# Patient Record
Sex: FEMALE | Race: WHITE | Employment: UNEMPLOYED | URBAN - METROPOLITAN AREA
[De-identification: names, ages, dates, MRNs, and addresses within clinical notes are randomized per-mention and may not be internally consistent; named-entity substitution may affect disease eponyms.]

---

## 2020-03-27 ENCOUNTER — TELEPHONE (OUTPATIENT)
Dept: FAMILY MEDICINE CLINIC | Age: 25
End: 2020-03-27

## 2023-12-13 ENCOUNTER — ROUTINE PRENATAL (OUTPATIENT)
Dept: OBGYN CLINIC | Age: 28
End: 2023-12-13

## 2023-12-13 VITALS
SYSTOLIC BLOOD PRESSURE: 116 MMHG | WEIGHT: 142 LBS | DIASTOLIC BLOOD PRESSURE: 80 MMHG | BODY MASS INDEX: 22.92 KG/M2 | HEART RATE: 98 BPM

## 2023-12-13 DIAGNOSIS — Z3A.14 14 WEEKS GESTATION OF PREGNANCY: ICD-10-CM

## 2023-12-13 DIAGNOSIS — Z34.00 SUPERVISION OF NORMAL FIRST PREGNANCY, ANTEPARTUM: Primary | ICD-10-CM

## 2023-12-13 PROCEDURE — 0502F SUBSEQUENT PRENATAL CARE: CPT

## 2023-12-13 RX ORDER — MAGNESIUM 30 MG
30 TABLET ORAL 2 TIMES DAILY
COMMUNITY

## 2023-12-13 NOTE — PROGRESS NOTES
SUBJECTIVE:  Remy Ballard is here for her return OB visit & Physical Exam.   She denies feeling fetal movement. She denies vaginal bleeding. She denies vaginal discharge. She denies leaking of fluid. She denies uterine cramping. She denies nausea and/or vomiting. Remy Ballard reports she is doing well, no concerns today. Has been having intermittent headaches but started taking magnesium which has been helpful. OBJECTIVE:  Blood pressure 116/80, pulse 98, weight 64.4 kg (142 lb), last menstrual period 08/17/2023, not currently breastfeeding. Pregravid BMI: 21.80   TWG: 3.175 kg (7 lb)    See OB Physical in Epic Navigator   Last pap: 1/23/2020 - will do PP    O negative    Remy Ballard has not received the influenza vaccine as appropriate - declines for now    ASSESSMENT/PLAN:  IUP at [address] [facility] will watch for fetal movement. NT screening was previously declined  NIPT was previously accepted: WNL  Carrier screening was previously accepted: negative  [x] EDC was established.  [] Labs were reviewed. [x] Single sMAFP was ordered for the 16-20 wk gestational window. [] Weight gain guidelines was not reviewed. Normal: BMI < 25: Gain 25-35 lb  Overweight: BMI 25-30: Gain 15-25 lb  Obese: BMI > 30: Gain 11-20 lb  [x] Reviewed warning signs: cramping, acute abdominal pain, dysuria, fever/chills, abnormal vaginal discharge or leaking fluid, or vaginal bleeding. [x] MFM referral in place for anatomy scan at 18-22 weeks. [x] I have reviewed RN Initial OB Visit. S = D    Remy Ballard was seen today for routine prenatal visit. Diagnoses and all orders for this visit:    Supervision of normal first pregnancy, antepartum  Anatomy scan 1/25/24  NIPT WNL    14 weeks gestation of pregnancy      Return in about 4 weeks (around 1/10/2024) for OB visit with midwives.     The patient, Betty Bonilla, was seen with a total time spent of 30 minutes for the visit on this date of service by the [facility]  The time component

## 2024-01-08 PROBLEM — L50.9 URTICARIA: Chronic | Status: RESOLVED | Noted: 2017-12-14 | Resolved: 2024-01-08

## 2024-01-08 NOTE — PROGRESS NOTES
SUBJECTIVE:  Neha is here for her return OB visit.  She is doing well overall, but reporting increased vaginal discharge without odor, irritation, itchy, etc, unsure if normal  Occasional left sided pain as well, still with lingering fatigue  She reports unsure fetal movement.  She denies vaginal bleeding.  She denies leaking of fluid.  She denies uterine contraction activity.  She denies nausea and/or vomiting.  She denies retaining fluid in her extremities.    OBJECTIVE:  Blood pressure 110/60, pulse (!) 104, weight 65.8 kg (145 lb), last menstrual period 08/17/2023, not currently breastfeeding.        ASSESSMENT/PLAN:  1. 18 weeks gestation of pregnancy  S=D  2. Supervision of normal first pregnancy, antepartum  The problem list was reviewed and updated as needed with any new issues today    Neha will begin to monitor fetal movement daily    [x] sAFP  was  discussed and she declined today  [x] Anticipatory Guidance was reviewed today  [x] Has 2nd trimester US scheduled  - Vaginitis DNA Probe; Future    3. Vaginal discharge  - Discussed probable normal discharge in pregnancy, given discomfort will await results  - Vaginitis DNA Probe; Future      Neha was counseled regarding all of the above    The patient, Neha Kolb,  was seen with a total time spent of 20 minutes for the visit on this date of service by the [facility]  The time component involved both face-to-face (counseling and education) and non face-to-face time (care coordination), spent in determining the total time component.     On this date January 10, 2024,  I have spent greater than 50% of this visit:  [x] Reviewing previous notes/pre-charting  [] Reviewing test results  [] Performing necessary physical exam/evaluation  [x] Ordering medications, tests, procedures  [] Placing referrals or communicating with other HCP  [x] Documenting and updating Problem List as necessary  [x] Importance of compliance with treatment plan discussed  [x]

## 2024-01-10 ENCOUNTER — HOSPITAL ENCOUNTER (OUTPATIENT)
Age: 29
Setting detail: SPECIMEN
Discharge: HOME | End: 2024-01-10

## 2024-01-10 ENCOUNTER — ROUTINE PRENATAL (OUTPATIENT)
Dept: OBGYN CLINIC | Age: 29
End: 2024-01-10

## 2024-01-10 VITALS
HEART RATE: 104 BPM | WEIGHT: 145 LBS | BODY MASS INDEX: 23.4 KG/M2 | DIASTOLIC BLOOD PRESSURE: 60 MMHG | SYSTOLIC BLOOD PRESSURE: 110 MMHG

## 2024-01-10 DIAGNOSIS — N89.8 VAGINAL DISCHARGE: ICD-10-CM

## 2024-01-10 DIAGNOSIS — Z34.00 SUPERVISION OF NORMAL FIRST PREGNANCY, ANTEPARTUM: Primary | ICD-10-CM

## 2024-01-10 DIAGNOSIS — Z3A.18 18 WEEKS GESTATION OF PREGNANCY: ICD-10-CM

## 2024-01-10 PROCEDURE — 0502F SUBSEQUENT PRENATAL CARE: CPT | Performed by: ADVANCED PRACTICE MIDWIFE

## 2024-01-10 NOTE — PROGRESS NOTES
Pt is here today at her 18w4 prenatal visit  Pt states fetal movement is unsure  Pt has  concerns about vaginal discharge  Pt declines flu shot

## 2024-01-11 DIAGNOSIS — Z34.00 SUPERVISION OF NORMAL FIRST PREGNANCY, ANTEPARTUM: ICD-10-CM

## 2024-01-11 DIAGNOSIS — Z3A.18 18 WEEKS GESTATION OF PREGNANCY: ICD-10-CM

## 2024-01-11 DIAGNOSIS — N89.8 VAGINAL DISCHARGE: ICD-10-CM

## 2024-01-11 LAB
CANDIDA SPECIES: NEGATIVE
GARDNERELLA VAGINALIS: NEGATIVE
SOURCE: NORMAL
TRICHOMONAS: NEGATIVE

## 2024-01-25 ENCOUNTER — ROUTINE PRENATAL (OUTPATIENT)
Dept: PERINATAL CARE | Age: 29
End: 2024-01-25
Payer: COMMERCIAL

## 2024-01-25 VITALS
HEIGHT: 66 IN | SYSTOLIC BLOOD PRESSURE: 96 MMHG | BODY MASS INDEX: 24.27 KG/M2 | WEIGHT: 151 LBS | HEART RATE: 80 BPM | DIASTOLIC BLOOD PRESSURE: 58 MMHG | TEMPERATURE: 98 F | RESPIRATION RATE: 16 BRPM

## 2024-01-25 DIAGNOSIS — O44.40 LOW IMPLANTATION OF PLACENTA WITHOUT HEMORRHAGE: ICD-10-CM

## 2024-01-25 DIAGNOSIS — Z3A.20 20 WEEKS GESTATION OF PREGNANCY: ICD-10-CM

## 2024-01-25 DIAGNOSIS — O35.10X0 FAMILY HISTORIC RISK OF CHROMOSOMAL ABNORMALITY IN FETUS, ANTEPARTUM, SINGLE OR UNSPECIFIED FETUS: Primary | ICD-10-CM

## 2024-01-25 DIAGNOSIS — Z36.86 ENCOUNTER FOR SCREENING FOR RISK OF PRE-TERM LABOR: ICD-10-CM

## 2024-01-25 PROCEDURE — 99999 PR OFFICE/OUTPT VISIT,PROCEDURE ONLY: CPT | Performed by: OBSTETRICS & GYNECOLOGY

## 2024-01-25 PROCEDURE — 76817 TRANSVAGINAL US OBSTETRIC: CPT | Performed by: OBSTETRICS & GYNECOLOGY

## 2024-01-25 PROCEDURE — 76811 OB US DETAILED SNGL FETUS: CPT | Performed by: OBSTETRICS & GYNECOLOGY

## 2024-01-25 NOTE — PROGRESS NOTES
Please refer to NIPT results drawn in primary OB office.    Please refer to completed maternal carrier testing ([facility] Carrier Screen).      Options with respect to offering the patient invasive genetic prenatal testing and MSAFP screen could not be completed today, as the patient declines a Maternal-Fetal Medicine physician consultation today.     Patient declines a Maternal-Fetal Medicine complete physician consultation today regarding the fetal and/or maternal medical/obstetrical complications/co-morbidities of pregnancy.      Maternal-Fetal Medicine (MFM) attending physician will defer all management for these medical/obstetrical complications of pregnancy to the primary attending obstetrical physician/provider, as a result.  Therefore, only an ultrasound evaluation was completed today in the MFM office.      Please refer to Maternal-Fetal Medicine OBGYN resident progress note in EPIC.

## 2024-01-25 NOTE — PROGRESS NOTES
Obstetric/Gynecology Maternal Fetal Medicine Resident Note    Patient declines formal consult with MFM attending physician for family history of Trisomy 21.     Ulices Freeman MD  OBGYN Resident, PGY2  [facility]  1/25/2024, 4:10 PM

## 2024-02-05 ENCOUNTER — ROUTINE PRENATAL (OUTPATIENT)
Dept: OBGYN CLINIC | Age: 29
End: 2024-02-05

## 2024-02-05 VITALS — SYSTOLIC BLOOD PRESSURE: 102 MMHG | BODY MASS INDEX: 24.37 KG/M2 | WEIGHT: 151 LBS | DIASTOLIC BLOOD PRESSURE: 60 MMHG

## 2024-02-05 DIAGNOSIS — Z34.00 SUPERVISION OF NORMAL FIRST PREGNANCY, ANTEPARTUM: ICD-10-CM

## 2024-02-05 DIAGNOSIS — Z3A.22 22 WEEKS GESTATION OF PREGNANCY: Primary | ICD-10-CM

## 2024-02-05 DIAGNOSIS — O26.899 RH NEGATIVE STATE IN ANTEPARTUM PERIOD: ICD-10-CM

## 2024-02-05 DIAGNOSIS — Z67.91 RH NEGATIVE STATE IN ANTEPARTUM PERIOD: ICD-10-CM

## 2024-02-05 PROBLEM — O44.40 LOW-LYING PLACENTA: Status: ACTIVE | Noted: 2024-02-05

## 2024-02-05 PROCEDURE — 0502F SUBSEQUENT PRENATAL CARE: CPT | Performed by: ADVANCED PRACTICE MIDWIFE

## 2024-02-05 NOTE — PROGRESS NOTES
Pt is here today at her 22w2 prenatal visit  Pt states fetal movement is present  Pt has  no concerns

## 2024-02-05 NOTE — PROGRESS NOTES
SUBJECTIVE:  Neha is here for her return OB visit.  She reports fetal movement.  She denies vaginal bleeding.  She denies vaginal discharge.  She denies leaking of fluid.  She denies uterine contraction activity.  She denies nausea and/or vomiting.  She denies retaining fluid in her extremities.  Discussed GCT at NOV and Rhogam at 28 weeks.  Had anatomy scan and needs to return for Low lying placenta.        OBJECTIVE:  Blood pressure 102/60, weight 68.5 kg (151 lb), last menstrual period 08/17/2023, not currently breastfeeding.        ASSESSMENT/PLAN:  1. 22 weeks gestation of pregnancy      2. Supervision of normal first pregnancy, antepartum      3. Rh negative state in antepartum period        The problem list was reviewed and updated as needed with any new issues today    Neha will begin to monitor fetal movement daily    Quad screen and or single marker AFP results were not discussed.  Labs were not ordered.    BMI and appropriate weight gain recommendations were discussed.  Normal: BMI < 25: Gain 25-35 lb  Overweight: BMI 25-30: Gain 15-25 lb  Obese: BMI > 30: Gain 11-20 lb    Neha was counseled regarding all of the above      Patient was seen with total face to face time of 15 minutes. More than 50% of this  visit was for counseling and education.

## 2024-03-05 ENCOUNTER — ROUTINE PRENATAL (OUTPATIENT)
Dept: OBGYN CLINIC | Age: 29
End: 2024-03-05
Payer: COMMERCIAL

## 2024-03-05 ENCOUNTER — HOSPITAL ENCOUNTER (OUTPATIENT)
Age: 29
Setting detail: SPECIMEN
Discharge: HOME | End: 2024-03-05

## 2024-03-05 VITALS
DIASTOLIC BLOOD PRESSURE: 62 MMHG | WEIGHT: 156.8 LBS | BODY MASS INDEX: 25.31 KG/M2 | HEART RATE: 81 BPM | SYSTOLIC BLOOD PRESSURE: 104 MMHG

## 2024-03-05 DIAGNOSIS — Z34.00 SUPERVISION OF NORMAL FIRST PREGNANCY, ANTEPARTUM: Primary | ICD-10-CM

## 2024-03-05 DIAGNOSIS — O26.899 RH NEGATIVE STATE IN ANTEPARTUM PERIOD: ICD-10-CM

## 2024-03-05 DIAGNOSIS — O44.40 LOW-LYING PLACENTA: ICD-10-CM

## 2024-03-05 DIAGNOSIS — Z3A.26 26 WEEKS GESTATION OF PREGNANCY: ICD-10-CM

## 2024-03-05 DIAGNOSIS — Z67.91 RH NEGATIVE STATE IN ANTEPARTUM PERIOD: ICD-10-CM

## 2024-03-05 DIAGNOSIS — Z34.00 SUPERVISION OF NORMAL FIRST PREGNANCY, ANTEPARTUM: ICD-10-CM

## 2024-03-05 PROBLEM — N94.6 DYSMENORRHEA: Status: RESOLVED | Noted: 2020-01-22 | Resolved: 2024-03-05

## 2024-03-05 LAB
BASOPHILS # BLD: <0.03 K/UL (ref 0–0.2)
EOSINOPHIL # BLD: 0.12 K/UL (ref 0–0.44)
ERYTHROCYTE [DISTWIDTH] IN BLOOD BY AUTOMATED COUNT: 13.9 % (ref 11.8–14.4)
HCT VFR BLD AUTO: 35.6 % (ref 36.3–47.1)
HGB BLD-MCNC: 11.9 G/DL (ref 11.9–15.1)
IMM GRANULOCYTES NFR BLD: 0 %
LYMPHOCYTES NFR BLD: 1.24 K/UL (ref 1.1–3.7)
LYMPHOCYTES RELATIVE PERCENT: 17 % (ref 24–43)
MCHC RBC AUTO-ENTMCNC: 33.4 G/DL (ref 28.4–34.8)
MONOCYTES NFR BLD: 0.47 K/UL (ref 0.1–1.2)
MONOCYTES NFR BLD: 6 % (ref 3–12)
NEUTROPHILS NFR BLD: 75 % (ref 36–65)
NEUTS SEG NFR BLD: 5.56 K/UL (ref 1.5–8.1)
NRBC BLD-RTO: 0 PER 100 WBC
PLATELET # BLD AUTO: 203 K/UL (ref 138–453)
PMV BLD AUTO: 11 FL (ref 8.1–13.5)
RBC # BLD AUTO: 3.6 M/UL (ref 3.95–5.11)
WBC OTHER # BLD: 7.4 K/UL (ref 3.5–11.3)

## 2024-03-05 PROCEDURE — 96372 THER/PROPH/DIAG INJ SC/IM: CPT | Performed by: ADVANCED PRACTICE MIDWIFE

## 2024-03-05 PROCEDURE — 0502F SUBSEQUENT PRENATAL CARE: CPT | Performed by: ADVANCED PRACTICE MIDWIFE

## 2024-03-05 PROCEDURE — 36415 COLL VENOUS BLD VENIPUNCTURE: CPT | Performed by: ADVANCED PRACTICE MIDWIFE

## 2024-03-05 ASSESSMENT — PATIENT HEALTH QUESTIONNAIRE - PHQ9
1. LITTLE INTEREST OR PLEASURE IN DOING THINGS: 0
2. FEELING DOWN, DEPRESSED OR HOPELESS: 0
SUM OF ALL RESPONSES TO PHQ QUESTIONS 1-9: 0
SUM OF ALL RESPONSES TO PHQ QUESTIONS 1-9: 0
SUM OF ALL RESPONSES TO PHQ9 QUESTIONS 1 & 2: 0
SUM OF ALL RESPONSES TO PHQ QUESTIONS 1-9: 0
SUM OF ALL RESPONSES TO PHQ QUESTIONS 1-9: 0

## 2024-03-05 NOTE — PROGRESS NOTES
Pt is here today at her 26.3 pnv 1 glucose testing, cbc , antibody screen and rhogam.  Pt states fetal movement is good  Pt has no concerns       Glucose drink 50g given @ 2:29 pm   Drink finished @ 2:30 pm    Blood draw @ 3:30 pm    Lot- [id number]   Exp- 01/31/2026

## 2024-03-05 NOTE — PROGRESS NOTES
SUBJECTIVE:  Neha is here for her return OB visit.  She reports fetal movement.  She denies vaginal bleeding.  She denies vaginal discharge.  She denies leaking of fluid.  She denies uterine contraction activity.  She denies nausea and/or vomiting.  She denies retaining fluid in her extremities.  Neha reports she is doing well, no concerns. Doing glucola today.       OBJECTIVE:  Blood pressure 104/62, pulse 81, weight 71.1 kg (156 lb 12.8 oz), last menstrual period 2023, not currently breastfeeding.    Pregravid BMI: 21.80   TW.888 kg (21 lb 12.8 oz)    Neha has not the Tdap vaccine as appropriate, (offer between 27-37 weeks)    Postpartum Depression: Low Risk  (11/15/2023)    [place]  Depression Scale     Last EPDS Total Score: 4     Last EPDS Self Harm Result: Never       ASSESSMENT/PLAN:  IUP @ 26w3d  Neha will monitor fetal movement daily.  28 week lab panel was discussed and was ordered. Neha accepted repeat HIV and T. Pallidum testing.   Glucola testing was initiated during this visit.   RhoGam was discussed. Given today after antibody screen drawn.   Given 24 week packet   S = D    Supervision of normal first pregnancy, antepartum  Glucose Tolerance, 1 Hr; Future  CBC with Auto Differential; Future  Antibody Screen; Future  Rho(D) immune globulin (HYPERRHO;RHOGAM) injection 300 mcg  HIV Screen; Future  T. pallidum Ab; Future  Chart to collaborator after glucola results     Rh negative state in antepartum period  Antibody Screen; Future  Rho(D) immune globulin (HYPERRHO;RHOGAM) injection 300 mcg    Low-lying placenta  24 EFW 13 oz, AC 40% \"low lying placenta\" (21.7 mm from internal os), breech presentation, normal fetal anatomy - follow up 3/28/24 as scheduled         Return in about 4 weeks (around 2024) for OB visit with Midwives.     The patient, Neha Kolb, was seen with a total time spent of 3/28/24 minutes for the visit on this date of service

## 2024-03-05 NOTE — PROGRESS NOTES
After obtaining consent, and per orders of Agueda Manzanares,APRN-CNM injection of Rhogam given in Left deltoid by Marianna Hernandez MA. Patient instructed to remain in clinic for 20 minutes afterwards, and to report any adverse reaction to me immediately.      NDC- [id number]  LOT-[id number]  EXP- 03/23/2026

## 2024-03-06 DIAGNOSIS — O99.810 ABNORMAL GLUCOSE TOLERANCE TEST IN PREGNANCY: Primary | ICD-10-CM

## 2024-03-06 LAB
BLOOD GROUP ANTIBODIES SERPL: NEGATIVE
GLUCOSE 1H P 50 G GLC PO SERPL-MCNC: 131 MG/DL (ref 70–135)
GLUCOSE ADMINISTRATION: NORMAL
HIV 1+2 AB+HIV1 P24 AG SERPL QL IA: NONREACTIVE
T PALLIDUM AB SER QL IA: NONREACTIVE

## 2024-03-07 ENCOUNTER — HOSPITAL ENCOUNTER (OUTPATIENT)
Age: 29
Setting detail: SPECIMEN
Discharge: HOME | End: 2024-03-07

## 2024-03-07 DIAGNOSIS — O99.810 ABNORMAL GLUCOSE TOLERANCE TEST IN PREGNANCY: ICD-10-CM

## 2024-03-09 LAB
AMOUNT GLUCOSE GIVEN: 100 G
GLUCOSE 2H P 100 G GLC PO SERPL-MCNC: 71 MG/DL
GLUCOSE 3H P 100 G GLC PO SERPL-MCNC: 127 MG/DL (ref 65–179)
GLUCOSE TOLERANCE TEST 2 HOUR: 90 MG/DL (ref 65–154)
GLUCOSE TOLERANCE TEST 3 HOUR: 92 MG/DL (ref 65–139)

## 2024-03-28 ENCOUNTER — ROUTINE PRENATAL (OUTPATIENT)
Dept: PERINATAL CARE | Age: 29
End: 2024-03-28
Payer: COMMERCIAL

## 2024-03-28 VITALS
HEIGHT: 66 IN | SYSTOLIC BLOOD PRESSURE: 89 MMHG | WEIGHT: 165 LBS | TEMPERATURE: 97.7 F | DIASTOLIC BLOOD PRESSURE: 61 MMHG | BODY MASS INDEX: 26.52 KG/M2 | RESPIRATION RATE: 16 BRPM | HEART RATE: 71 BPM

## 2024-03-28 DIAGNOSIS — Z3A.29 29 WEEKS GESTATION OF PREGNANCY: ICD-10-CM

## 2024-03-28 DIAGNOSIS — O43.103 PLACENTAL ABNORMALITY IN THIRD TRIMESTER: ICD-10-CM

## 2024-03-28 DIAGNOSIS — Z03.72 SUSPECTED PLACENTAL PROBLEM NOT FOUND: ICD-10-CM

## 2024-03-28 DIAGNOSIS — O44.40 LOW IMPLANTATION OF PLACENTA WITHOUT HEMORRHAGE: ICD-10-CM

## 2024-03-28 DIAGNOSIS — Z13.89 ENCOUNTER FOR ROUTINE SCREENING FOR MALFORMATION USING ULTRASONICS: ICD-10-CM

## 2024-03-28 DIAGNOSIS — Z36.4 ULTRASOUND FOR ANTENATAL SCREENING FOR FETAL GROWTH RESTRICTION: ICD-10-CM

## 2024-03-28 DIAGNOSIS — O35.10X0 FAMILY HISTORIC RISK OF CHROMOSOMAL ABNORMALITY IN FETUS, ANTEPARTUM, SINGLE OR UNSPECIFIED FETUS: Primary | ICD-10-CM

## 2024-03-28 PROCEDURE — 99999 PR OFFICE/OUTPT VISIT,PROCEDURE ONLY: CPT | Performed by: OBSTETRICS & GYNECOLOGY

## 2024-03-28 PROCEDURE — 76817 TRANSVAGINAL US OBSTETRIC: CPT | Performed by: OBSTETRICS & GYNECOLOGY

## 2024-03-28 PROCEDURE — 76805 OB US >/= 14 WKS SNGL FETUS: CPT | Performed by: OBSTETRICS & GYNECOLOGY

## 2024-03-28 PROCEDURE — 76819 FETAL BIOPHYS PROFIL W/O NST: CPT | Performed by: OBSTETRICS & GYNECOLOGY

## 2024-04-01 NOTE — PROGRESS NOTES
[facility] PHYS[facility] OBSTETRICS AND GYNECOLOGY  [address]   [address]  [place] [id number]  Dept: [phone]      Patient Name: Neha Kolb  Patient : 1995  MRN #: [id number]  [facility] #: [id number]    Date: 2024    Chief Complaint   Patient presents with    Routine Prenatal Visit     Patient's last menstrual period was 2023.    SUBJECTIVE:    Neha is here for her return OB visit.  She is 30w2d weeks pregnant.   She reports  feeling fetal movement.  She denies vaginal bleeding, vaginal discharge, leaking of fluid.  She denies uterine cramping.  She denies  nausea and/or vomiting.  She denies HA, visual changes, edema, or RUQ pain.     C/o daily acid reflux, tums not helping any more    OB History    Para Term  AB Living   1 0 0 0 0 0   SAB IAB Ectopic Molar Multiple Live Births   0 0 0   0        # Outcome Date GA Lbr Antwan/2nd Weight Sex Delivery Anes PTL Lv   1 Current               Obstetric Comments   G1- Fob-Osei     Past Medical History:   Diagnosis Date    Drug effect     HSV (herpes simplex virus) anogenital infection     Migraines      Past Surgical History:   Procedure Laterality Date    BREAST SURGERY Bilateral     FRACTURE SURGERY Right 2017     RIGHT 2ND METACARPAL REDUCTION  WITH KWIRE PLACEMENT performed by Jason Grace MD at [facility] OR    WISDOM TOOTH EXTRACTION       Current Outpatient Medications   Medication Sig Dispense Refill    magnesium 30 MG tablet Take 1 tablet by mouth 2 times daily      Prenatal Vit-Fe Fumarate-FA (PRENATAL VITAMIN PO) Take by mouth       No current facility-administered medications for this visit.     Allergies   Allergen Reactions    Amoxicillin Hives    Sulfa Antibiotics Hives       OBJECTIVE:  /62   Pulse (!) 104   Wt 75.4 kg (166 lb 3.2 oz)   LMP 2023   BMI 26.83 kg/m²   Wt Readings from Last 3 Encounters:   24 75.4 kg (166 lb 3.2

## 2024-04-02 ENCOUNTER — ROUTINE PRENATAL (OUTPATIENT)
Dept: OBGYN CLINIC | Age: 29
End: 2024-04-02

## 2024-04-02 VITALS
SYSTOLIC BLOOD PRESSURE: 106 MMHG | HEART RATE: 104 BPM | DIASTOLIC BLOOD PRESSURE: 62 MMHG | WEIGHT: 166.2 LBS | BODY MASS INDEX: 26.83 KG/M2

## 2024-04-02 DIAGNOSIS — Z3A.30 30 WEEKS GESTATION OF PREGNANCY: Primary | ICD-10-CM

## 2024-04-02 DIAGNOSIS — Z34.00 SUPERVISION OF NORMAL FIRST PREGNANCY, ANTEPARTUM: ICD-10-CM

## 2024-04-02 DIAGNOSIS — O44.40 LOW-LYING PLACENTA: ICD-10-CM

## 2024-04-02 DIAGNOSIS — O09.899 SUSCEPTIBLE TO VARICELLA (NON-IMMUNE), CURRENTLY PREGNANT: ICD-10-CM

## 2024-04-02 DIAGNOSIS — Z28.39 SUSCEPTIBLE TO VARICELLA (NON-IMMUNE), CURRENTLY PREGNANT: ICD-10-CM

## 2024-04-02 DIAGNOSIS — O26.899 RH NEGATIVE STATE IN ANTEPARTUM PERIOD: ICD-10-CM

## 2024-04-02 DIAGNOSIS — Z67.91 RH NEGATIVE STATE IN ANTEPARTUM PERIOD: ICD-10-CM

## 2024-04-02 PROCEDURE — 0502F SUBSEQUENT PRENATAL CARE: CPT | Performed by: ADVANCED PRACTICE MIDWIFE

## 2024-04-16 ENCOUNTER — ROUTINE PRENATAL (OUTPATIENT)
Dept: OBGYN CLINIC | Age: 29
End: 2024-04-16
Payer: COMMERCIAL

## 2024-04-16 VITALS
DIASTOLIC BLOOD PRESSURE: 68 MMHG | SYSTOLIC BLOOD PRESSURE: 110 MMHG | BODY MASS INDEX: 26.95 KG/M2 | HEART RATE: 101 BPM | WEIGHT: 167 LBS

## 2024-04-16 DIAGNOSIS — Z34.00 SUPERVISION OF NORMAL FIRST PREGNANCY, ANTEPARTUM: Primary | ICD-10-CM

## 2024-04-16 DIAGNOSIS — Z23 NEED FOR TDAP VACCINATION: ICD-10-CM

## 2024-04-16 PROCEDURE — 90471 IMMUNIZATION ADMIN: CPT

## 2024-04-16 PROCEDURE — 0502F SUBSEQUENT PRENATAL CARE: CPT

## 2024-04-16 PROCEDURE — 90715 TDAP VACCINE 7 YRS/> IM: CPT

## 2024-04-16 NOTE — PROGRESS NOTES
Pt is here today at her 32.3  Pt states fetal movement is present  Pt has no concerns  After obtaining consent, and per orders of  Renuka Suarez CNM injection of Tdap given in Left deltoid by Maira Martinez CMA.   NDC - 31934-253-02  Lot [id number]  Exp 5/31/26  
education) and non face-to-face time (care coordination), spent in determining the total time component.      On this date April 16, 2024, I have spent greater than 50% of this visit:  [x] Reviewing previous notes/pre-charting  [x] Reviewing test results  [x] Performing necessary physical exam/evaluation  [] Ordering medications, tests, procedures  [] Placing referrals or communicating with other HCP  [x] Documenting and updating Problem List as necessary  [] Importance of compliance with treatment plan discussed  [x] Counseling patient/support person  [] Coordinating care    Electronically signed by: PADMINI Valentine CNM

## 2024-04-30 ENCOUNTER — HOSPITAL ENCOUNTER (OUTPATIENT)
Age: 29
Setting detail: SPECIMEN
Discharge: HOME | End: 2024-04-30

## 2024-04-30 ENCOUNTER — ROUTINE PRENATAL (OUTPATIENT)
Dept: OBGYN CLINIC | Age: 29
End: 2024-04-30

## 2024-04-30 VITALS
SYSTOLIC BLOOD PRESSURE: 114 MMHG | WEIGHT: 172.4 LBS | HEART RATE: 103 BPM | DIASTOLIC BLOOD PRESSURE: 74 MMHG | BODY MASS INDEX: 27.83 KG/M2

## 2024-04-30 DIAGNOSIS — N89.8 VAGINAL DISCHARGE DURING PREGNANCY IN THIRD TRIMESTER: ICD-10-CM

## 2024-04-30 DIAGNOSIS — Z34.00 SUPERVISION OF NORMAL FIRST PREGNANCY, ANTEPARTUM: Primary | ICD-10-CM

## 2024-04-30 DIAGNOSIS — A60.00 GENITAL HERPES SIMPLEX, UNSPECIFIED SITE: Chronic | ICD-10-CM

## 2024-04-30 DIAGNOSIS — O26.893 VAGINAL DISCHARGE DURING PREGNANCY IN THIRD TRIMESTER: ICD-10-CM

## 2024-04-30 PROCEDURE — 0502F SUBSEQUENT PRENATAL CARE: CPT

## 2024-04-30 RX ORDER — VALACYCLOVIR HYDROCHLORIDE 500 MG/1
500 TABLET, FILM COATED ORAL 2 TIMES DAILY
Qty: 60 TABLET | Refills: 0 | Status: SHIPPED | OUTPATIENT
Start: 2024-04-30

## 2024-04-30 NOTE — PROGRESS NOTES
Pt is here today at her 34.3 pnv   Pt states fetal movement is good  Pt has abnormal discharge.

## 2024-04-30 NOTE — PROGRESS NOTES
SUBJECTIVE:  Neha is here for her return OB visit.  She reports fetal movement.  She denies vaginal bleeding.  She denies vaginal discharge.  She denies leaking of fluid.  She denies uterine contraction activity.  She denies nausea and/or vomiting.  She denies retaining fluid in her extremities.  She denies headache, vision changes, RUQ pain, and epigastric pain.  Neha reports her dogs had ticks and she found one crawling on her. Was not bitten and had her  check her for ticks with none attached. Also reports discharge that is milky and sticky with some itching.     OBJECTIVE:  Blood pressure 114/74, pulse (!) 103, weight 78.2 kg (172 lb 6.4 oz), last menstrual period 2023, not currently breastfeeding.     Pregravid BMI: 21.80   TW kg (37 lb 6.4 oz)    Neha has received the Tdap vaccine as appropriate  Rhogam was given 3/5/2024    ASSESSMENT/PLAN:  IUP @ 34w3d  Neha will monitor fetal movement daily.   [x] 28 week lab results were reviewed. Glucola passed 3 hour, Hgb 11.9.   [x] Fetal Kick Count was discussed and explained. She was instructed to lay on her left side 20 minutes after eating and count movements for up to 2 hours with a target value of 10 movements. Instructed to notify office if she does not make the target.  [x] Jay-Goldberg contractions vs  labor contractions were reviewed.  [x] Signs and symptoms of Pre-Eclampsia were reviewed and discussed.  [x] Initial discussion regarding birth plans was begun.  [x] Given 36 week birth packet for review.    S = D    Neha was seen today for routine prenatal visit.    Diagnoses and all orders for this visit:    Supervision of normal first pregnancy, antepartum    Vaginal discharge during pregnancy in third trimester  -     Vaginitis DNA Probe; Future    Genital herpes simplex, unspecified site  -     valACYclovir (VALTREX) 500 MG tablet; Take 1 tablet by mouth 2 times daily, to be taken until delivery for

## 2024-05-01 LAB
CANDIDA SPECIES: POSITIVE
GARDNERELLA VAGINALIS: NEGATIVE
SOURCE: ABNORMAL
TRICHOMONAS: NEGATIVE

## 2024-05-02 DIAGNOSIS — N76.0 ACUTE VAGINITIS: Primary | ICD-10-CM

## 2024-05-02 RX ORDER — FLUCONAZOLE 150 MG/1
150 TABLET ORAL ONCE
Qty: 1 TABLET | Refills: 0 | Status: SHIPPED | OUTPATIENT
Start: 2024-05-02 | End: 2024-05-02

## 2024-05-09 ENCOUNTER — ROUTINE PRENATAL (OUTPATIENT)
Dept: PERINATAL CARE | Age: 29
End: 2024-05-09
Payer: COMMERCIAL

## 2024-05-09 VITALS
DIASTOLIC BLOOD PRESSURE: 64 MMHG | HEIGHT: 66 IN | HEART RATE: 87 BPM | WEIGHT: 172 LBS | TEMPERATURE: 97 F | BODY MASS INDEX: 27.64 KG/M2 | SYSTOLIC BLOOD PRESSURE: 104 MMHG

## 2024-05-09 DIAGNOSIS — O43.103 PLACENTAL ABNORMALITY IN THIRD TRIMESTER: ICD-10-CM

## 2024-05-09 DIAGNOSIS — O99.810 ABNORMAL MATERNAL GLUCOSE TOLERANCE, ANTEPARTUM: ICD-10-CM

## 2024-05-09 DIAGNOSIS — Z3A.35 35 WEEKS GESTATION OF PREGNANCY: ICD-10-CM

## 2024-05-09 DIAGNOSIS — O35.10X0 FAMILY HISTORIC RISK OF CHROMOSOMAL ABNORMALITY IN FETUS, ANTEPARTUM, SINGLE OR UNSPECIFIED FETUS: Primary | ICD-10-CM

## 2024-05-09 DIAGNOSIS — Z36.4 ULTRASOUND FOR ANTENATAL SCREENING FOR FETAL GROWTH RESTRICTION: ICD-10-CM

## 2024-05-09 PROCEDURE — 76819 FETAL BIOPHYS PROFIL W/O NST: CPT | Performed by: OBSTETRICS & GYNECOLOGY

## 2024-05-09 PROCEDURE — 76816 OB US FOLLOW-UP PER FETUS: CPT | Performed by: OBSTETRICS & GYNECOLOGY

## 2024-05-16 ENCOUNTER — HOSPITAL ENCOUNTER (OUTPATIENT)
Age: 29
Setting detail: SPECIMEN
Discharge: HOME | End: 2024-05-16

## 2024-05-16 ENCOUNTER — ROUTINE PRENATAL (OUTPATIENT)
Dept: OBGYN CLINIC | Age: 29
End: 2024-05-16

## 2024-05-16 VITALS
DIASTOLIC BLOOD PRESSURE: 68 MMHG | SYSTOLIC BLOOD PRESSURE: 114 MMHG | WEIGHT: 173 LBS | HEART RATE: 83 BPM | BODY MASS INDEX: 27.92 KG/M2

## 2024-05-16 DIAGNOSIS — N89.8 VAGINAL DISCHARGE: ICD-10-CM

## 2024-05-16 DIAGNOSIS — Z34.00 SUPERVISION OF NORMAL FIRST PREGNANCY, ANTEPARTUM: Primary | ICD-10-CM

## 2024-05-16 DIAGNOSIS — A60.00 GENITAL HERPES SIMPLEX, UNSPECIFIED SITE: Chronic | ICD-10-CM

## 2024-05-16 DIAGNOSIS — Z3A.36 36 WEEKS GESTATION OF PREGNANCY: ICD-10-CM

## 2024-05-16 PROCEDURE — 0502F SUBSEQUENT PRENATAL CARE: CPT | Performed by: ADVANCED PRACTICE MIDWIFE

## 2024-05-16 NOTE — PROGRESS NOTES
Pt is here today at her 36.5  Pt states fetal movement is present  Pt is c/o vaginal itching  GBS today

## 2024-05-16 NOTE — PROGRESS NOTES
SUBJECTIVE:  Neha is here for her routine OB visit.  She reports fetal movement.  She denies vaginal bleeding.  She denies leaking of fluid.  She denies vaginal discharge.  She denies uterine contraction activity.  She denies nausea and/or vomiting.  She denies retaining fluid in her extremities  She denies headache, visual changes, epigastric discomfort  Neha reports her diflucan did help with yeast sx but feels discharge has returned, would like repeat swab.       OBJECTIVE:   Blood pressure 114/68, pulse 83, weight 78.5 kg (173 lb), last menstrual period 2023, not currently breastfeeding.    Pregravid BMI: 21.80   TW.2 kg (38 lb)    SVE:  deferred    Neha has received the Tdap vaccine as appropriate  Rhogam was given 3/5/24    ASSESSMENT/PLAN:  IUP @ 36w5d  Neha will monitor for fetal movements daily  [x]  GBS culture was obtained.    [x]  28 week lab results were reviewed. Glucola passed 3 hour , Hgb 11.9.   [x]  Signs of labor to report were  and when to call midwives was discussed  [x]  Birth preferences were discussed.  [x]  Has been given 36 week birth packet for review.  [x]  Signs and symptoms of Pre-Eclampsia were reviewed and discussed.  []  Post-dates testing and protocol were not discussed  []  Neha was not counseled regarding Post Partum Depression and help  []  She has not decided on contraceptive choice.  []  Chart has been reviewed by collaborating physician. N/A  S = D    Supervision of normal first pregnancy, antepartum  Culture, GBS with Penicillin Allergy; Future  Last MFM scan normal 24 - f/u PRN     Genital herpes simplex, unspecified site  No sx or prodromal sx  Received Valtrex RX, reinforced BID dosing    Vaginal discharge  Vaginitis DNA Probe; Future  Will treat any positive findings       []  Induction management was discussed and induction scheduled N/A    Return in about 1 week (around 2024) for OB visit with Midwives (still needs to meet

## 2024-05-17 DIAGNOSIS — N89.8 VAGINAL DISCHARGE: ICD-10-CM

## 2024-05-17 DIAGNOSIS — Z3A.36 36 WEEKS GESTATION OF PREGNANCY: ICD-10-CM

## 2024-05-17 DIAGNOSIS — Z34.00 SUPERVISION OF NORMAL FIRST PREGNANCY, ANTEPARTUM: ICD-10-CM

## 2024-05-17 LAB
CANDIDA SPECIES: NEGATIVE
GARDNERELLA VAGINALIS: NEGATIVE
SOURCE: NORMAL
TRICHOMONAS: NEGATIVE

## 2024-05-18 LAB
MICROORGANISM SPEC CULT: NORMAL
SPECIMEN DESCRIPTION: NORMAL

## 2024-05-20 ENCOUNTER — TELEPHONE (OUTPATIENT)
Dept: OBGYN CLINIC | Age: 29
End: 2024-05-20

## 2024-05-20 LAB
MICROORGANISM SPEC CULT: NORMAL
SPECIMEN DESCRIPTION: NORMAL

## 2024-05-20 NOTE — TELEPHONE ENCOUNTER
Pt LM on our VM asking to lauryn her appt. [facility] to pt, had to LM asking her to call back again. She can be lauryn to Thursday if the appt is still available.

## 2024-05-23 ENCOUNTER — ROUTINE PRENATAL (OUTPATIENT)
Dept: OBGYN CLINIC | Age: 29
End: 2024-05-23

## 2024-05-23 VITALS
DIASTOLIC BLOOD PRESSURE: 80 MMHG | HEART RATE: 97 BPM | WEIGHT: 173.2 LBS | SYSTOLIC BLOOD PRESSURE: 118 MMHG | BODY MASS INDEX: 27.96 KG/M2

## 2024-05-23 DIAGNOSIS — Z3A.37 37 WEEKS GESTATION OF PREGNANCY: Primary | ICD-10-CM

## 2024-05-23 DIAGNOSIS — Z67.91 RH NEGATIVE STATE IN ANTEPARTUM PERIOD: ICD-10-CM

## 2024-05-23 DIAGNOSIS — O26.899 RH NEGATIVE STATE IN ANTEPARTUM PERIOD: ICD-10-CM

## 2024-05-23 DIAGNOSIS — Z34.00 SUPERVISION OF NORMAL FIRST PREGNANCY, ANTEPARTUM: ICD-10-CM

## 2024-05-23 DIAGNOSIS — A60.00 GENITAL HERPES SIMPLEX, UNSPECIFIED SITE: Chronic | ICD-10-CM

## 2024-05-23 PROCEDURE — 0502F SUBSEQUENT PRENATAL CARE: CPT

## 2024-05-23 NOTE — PROGRESS NOTES
SUBJECTIVE:  Neha is here for her routine OB visit.  She reports fetal movement.  She denies vaginal bleeding.  She denies leaking of fluid.  She denies vaginal discharge.  She denies uterine contraction activity.  She denies nausea and/or vomiting.  She denies retaining fluid in her extremities  She denies headache, visual changes, epigastric discomfort    Neha reports feeling well overall. She is a  and she has a game 1 hour away this Friday, is nervous about going due to the gestational age of her baby. Discussed cervical exam and patient is desiring at this time.       OBJECTIVE:   Blood pressure 118/80, pulse 97, weight 78.6 kg (173 lb 3.2 oz), last menstrual period 2023, not currently breastfeeding.    Pregravid BMI: 21.80   TW.3 kg (38 lb 3.2 oz)    SVE:  1/50/-2     0 1 2 3 Patient    Dilation Closed 1-2 3-4 5-6 1    Effacement 0-30 40-50 60-70 >80 1    Station -3 -2 -1/0 +1/+2 1    Consistency Firm Med Soft  2    Position Post Mid Ant  0   TOTAL        5         Neha has received the Tdap vaccine as appropriate  Rhogam was given    ASSESSMENT/PLAN:  IUP @ 37w5d  Neha will monitor for fetal movements daily  [x]  GBS culture  Results were reviewed  [x]  28 week lab results were reviewed.   [x]  Signs of labor to report were  and when to call midwives was discussed  [x]  Birth preferences were discussed.  [x]  Has been given 36 week birth packet for review.  [x]  Signs and symptoms of Pre-Eclampsia were reviewed and discussed.  []  Post-dates testing and protocol were not discussed  []  Neha was not counseled regarding Post Partum Depression and help  [x]  She has not decided on contraceptive choice.  []  Chart has been reviewed by collaborating physician.     Neha was seen today for routine prenatal visit.    Diagnoses and all orders for this visit:    37 weeks gestation of pregnancy  S = D    Genital herpes simplex, unspecified site  Taking her Valtrex

## 2024-05-29 NOTE — PROGRESS NOTES
Pt is here today at her 38.5 pnv   Pt states fetal movement is good   Pt has no concerns, pt would like to have her cervix checked.

## 2024-05-29 NOTE — PROGRESS NOTES
SUBJECTIVE:    Neha is here for her routine OB visit.  Doing well and ready for labor!  Lost mucus plug, unsure if feeling contractions.   Desires SVE with sweep  She reports  fetal movement.  She denies  vaginal bleeding.  She denies  leaking of fluid.  She denies  vaginal discharge.  She denies  uterine contraction activity.  She denies  nausea and/or vomiting.  She denies retaining fluid in her extremities  She denies headache, visual changes, epigastric discomfort      OBJECTIVE:   Blood pressure 104/60, pulse 84, weight 78.9 kg (173 lb 14.4 oz), last menstrual period 08/17/2023, not currently breastfeeding.        3/5/2024     1:29 PM   Postpartum Depression Scale   I have been able to laugh and see the funny side of things As much as I always could   I have looked forward with enjoyment to things As much as I ever did   I have blamed myself unnecessarily when things went wrong No, never   I have been anxious or worried for no good reason No, not at all   I have felt scared or panicky for no good reason No, not at all   I haven't been able to cope lately No, I have been coping as well as ever   I have been so unhappy that I have had difficulty sleeping Not very often   I have felt sad or miserable No, not at all   I have been so unhappy that I have been crying No, never   The thought of harming myself has occurred to me Never   Total 1            3/5/2024     2:31 PM 10/9/2023     3:59 PM 10/8/2023     8:17 AM 10/5/2023    10:59 AM 10/3/2022     3:57 PM 2/1/2021     3:51 PM   PHQ Scores   PHQ2 Score 0 0 0    0    0 0 0 0   PHQ9 Score 0 0 0    0    0 0 0 0           11/15/2023     2:00 PM   EDWAR 7 SCORE   EDWAR-7 Total Score 0         Chaperone: Declined  SVE: Posterior/softening/1 cm/50%/-2     0 1 2 3 Patient    Dilation Closed 1-2 3-4 5-6 1    Effacement 0-30 40-50 60-70 >80 1    Station -3 -2 -1/0 +1/+2 1    Consistency Firm Med Soft  1    Position Post Mid Ant  0   TOTAL        4

## 2024-05-30 ENCOUNTER — ROUTINE PRENATAL (OUTPATIENT)
Dept: OBGYN CLINIC | Age: 29
End: 2024-05-30

## 2024-05-30 VITALS
BODY MASS INDEX: 28.07 KG/M2 | DIASTOLIC BLOOD PRESSURE: 60 MMHG | SYSTOLIC BLOOD PRESSURE: 104 MMHG | WEIGHT: 173.9 LBS | HEART RATE: 84 BPM

## 2024-05-30 DIAGNOSIS — A60.04 HERPES SIMPLEX VULVOVAGINITIS: Chronic | ICD-10-CM

## 2024-05-30 DIAGNOSIS — O26.899 RH NEGATIVE STATE IN ANTEPARTUM PERIOD: ICD-10-CM

## 2024-05-30 DIAGNOSIS — Z3A.38 38 WEEKS GESTATION OF PREGNANCY: ICD-10-CM

## 2024-05-30 DIAGNOSIS — Z34.00 SUPERVISION OF NORMAL FIRST PREGNANCY, ANTEPARTUM: Primary | ICD-10-CM

## 2024-05-30 DIAGNOSIS — Z67.91 RH NEGATIVE STATE IN ANTEPARTUM PERIOD: ICD-10-CM

## 2024-05-30 PROCEDURE — 0502F SUBSEQUENT PRENATAL CARE: CPT | Performed by: ADVANCED PRACTICE MIDWIFE

## 2024-06-03 ENCOUNTER — ROUTINE PRENATAL (OUTPATIENT)
Dept: OBGYN CLINIC | Age: 29
End: 2024-06-03

## 2024-06-03 VITALS
HEART RATE: 80 BPM | DIASTOLIC BLOOD PRESSURE: 62 MMHG | SYSTOLIC BLOOD PRESSURE: 104 MMHG | BODY MASS INDEX: 28.13 KG/M2 | WEIGHT: 174.3 LBS

## 2024-06-03 DIAGNOSIS — A60.04 HERPES SIMPLEX VULVOVAGINITIS: Chronic | ICD-10-CM

## 2024-06-03 DIAGNOSIS — Z3A.39 39 WEEKS GESTATION OF PREGNANCY: ICD-10-CM

## 2024-06-03 DIAGNOSIS — Z34.00 SUPERVISION OF NORMAL FIRST PREGNANCY, ANTEPARTUM: Primary | ICD-10-CM

## 2024-06-03 PROCEDURE — 0502F SUBSEQUENT PRENATAL CARE: CPT | Performed by: ADVANCED PRACTICE MIDWIFE

## 2024-06-03 NOTE — PROGRESS NOTES
Pt is here today at her 39.2 pnv   Pt states fetal movement is good   Pt has no concerns      Pt would like her cervix checked.

## 2024-06-03 NOTE — PROGRESS NOTES
SUBJECTIVE:  Neha is here for her routine OB visit.  She reports fetal movement.  She denies vaginal bleeding.  She denies leaking of fluid.  She denies vaginal discharge.  She denies uterine contraction activity.  She denies nausea and/or vomiting.  She denies retaining fluid in her extremities  She denies headache, visual changes, epigastric discomfort  Neha reports she is doing okay, does not think she wants to reach 41 weeks but does not want to schedule at this time. Would like SVE today.       OBJECTIVE:   Blood pressure 104/62, pulse 80, weight 79.1 kg (174 lb 4.8 oz), last menstrual period 2023, not currently breastfeeding.    Pregravid BMI: 21.80   TW.8 kg (39 lb 4.8 oz)    SVE:  /-2, posterior, soft - membranes swept per request     0 1 2 3 Patient    Dilation Closed 1-2 3-4 5-6 1    Effacement 0-30 40-50 60-70 >80 1    Station -3 -2 -1/0 +1/+2 1    Consistency Firm Med Soft  2    Position Post Mid Ant  0   TOTAL        5      Neha has received the Tdap vaccine as appropriate  Rhogam was given 3/5/24    ASSESSMENT/PLAN:  IUP @ 39w2d   Neha will monitor for fetal movements daily  [x]  GBS culture negative   [x]  28 week lab results were reviewed. Glucola passed 3 hour , Hgb 11.9.   [x]  Signs of labor to report were  and when to call midwives was discussed  [x]  Birth preferences were discussed.  [x]  Has been given 36 week birth packet for review.  [x]  Signs and symptoms of Pre-Eclampsia were reviewed and discussed.  []  Post-dates testing and protocol were not discussed  []  Neha was not counseled regarding Post Partum Depression and help  []  She has not decided on contraceptive choice.  []  Chart has been reviewed by collaborating physician. N/A  S = D    Supervision of normal first pregnancy, antepartum  Last MFM scan normal 24 - f/u PRN     Genital herpes simplex, unspecified site  No sx or prodromal sx  Received Valtrex RX, reinforced BID dosing        []

## 2024-06-09 ENCOUNTER — ANESTHESIA (OUTPATIENT)
Dept: LABOR AND DELIVERY | Age: 29
End: 2024-06-09
Payer: COMMERCIAL

## 2024-06-09 ENCOUNTER — TELEPHONE (OUTPATIENT)
Dept: OBGYN | Age: 29
End: 2024-06-09

## 2024-06-09 ENCOUNTER — ANESTHESIA EVENT (OUTPATIENT)
Dept: LABOR AND DELIVERY | Age: 29
End: 2024-06-09
Payer: COMMERCIAL

## 2024-06-09 ENCOUNTER — HOSPITAL ENCOUNTER (INPATIENT)
Age: 29
LOS: 2 days | Discharge: HOME | End: 2024-06-11
Attending: STUDENT IN AN ORGANIZED HEALTH CARE EDUCATION/TRAINING PROGRAM | Admitting: STUDENT IN AN ORGANIZED HEALTH CARE EDUCATION/TRAINING PROGRAM
Payer: COMMERCIAL

## 2024-06-09 PROBLEM — Z3A.40 40 WEEKS GESTATION OF PREGNANCY: Status: ACTIVE | Noted: 2024-06-09

## 2024-06-09 LAB
ABO + RH BLD: NORMAL
AMPHET UR QL SCN: NEGATIVE
ARM BAND NUMBER: NORMAL
BARBITURATES UR QL SCN: NEGATIVE
BASOPHILS # BLD: 0.04 K/UL (ref 0–0.2)
BASOPHILS NFR BLD: 0 % (ref 0–2)
BENZODIAZ UR QL: NEGATIVE
BLOOD BANK SAMPLE EXPIRATION: NORMAL
BLOOD GROUP ANTIBODIES SERPL: NEGATIVE
CANNABINOIDS UR QL SCN: NEGATIVE
COCAINE UR QL SCN: NEGATIVE
EOSINOPHIL # BLD: 0.22 K/UL (ref 0–0.44)
EOSINOPHILS RELATIVE PERCENT: 2 % (ref 1–4)
ERYTHROCYTE [DISTWIDTH] IN BLOOD BY AUTOMATED COUNT: 14 % (ref 11.8–14.4)
FENTANYL UR QL: NEGATIVE
HCT VFR BLD AUTO: 36.7 % (ref 36.3–47.1)
HGB BLD-MCNC: 12.5 G/DL (ref 11.9–15.1)
IMM GRANULOCYTES # BLD AUTO: 0.1 K/UL (ref 0–0.3)
IMM GRANULOCYTES NFR BLD: 1 %
LYMPHOCYTES NFR BLD: 1.66 K/UL (ref 1.1–3.7)
LYMPHOCYTES RELATIVE PERCENT: 17 % (ref 24–43)
MCH RBC QN AUTO: 32.5 PG (ref 25.2–33.5)
MCHC RBC AUTO-ENTMCNC: 34.1 G/DL (ref 28.4–34.8)
MCV RBC AUTO: 95.3 FL (ref 82.6–102.9)
METHADONE UR QL: NEGATIVE
MONOCYTES NFR BLD: 0.84 K/UL (ref 0.1–1.2)
MONOCYTES NFR BLD: 9 % (ref 3–12)
NEUTROPHILS NFR BLD: 71 % (ref 36–65)
NEUTS SEG NFR BLD: 6.99 K/UL (ref 1.5–8.1)
NRBC BLD-RTO: 0 PER 100 WBC
OPIATES UR QL SCN: POSITIVE
OXYCODONE UR QL SCN: NEGATIVE
PCP UR QL SCN: NEGATIVE
PLATELET # BLD AUTO: 144 K/UL (ref 138–453)
PMV BLD AUTO: 11.6 FL (ref 8.1–13.5)
RBC # BLD AUTO: 3.85 M/UL (ref 3.95–5.11)
T PALLIDUM AB SER QL IA: NONREACTIVE
TEST INFORMATION: ABNORMAL
WBC OTHER # BLD: 9.9 K/UL (ref 3.5–11.3)

## 2024-06-09 PROCEDURE — 80307 DRUG TEST PRSMV CHEM ANLYZR: CPT

## 2024-06-09 PROCEDURE — 86850 RBC ANTIBODY SCREEN: CPT

## 2024-06-09 PROCEDURE — 2500000003 HC RX 250 WO HCPCS

## 2024-06-09 PROCEDURE — 85025 COMPLETE CBC W/AUTO DIFF WBC: CPT

## 2024-06-09 PROCEDURE — 86901 BLOOD TYPING SEROLOGIC RH(D): CPT

## 2024-06-09 PROCEDURE — 3700000025 EPIDURAL BLOCK: Performed by: ANESTHESIOLOGY

## 2024-06-09 PROCEDURE — 1220000000 HC SEMI PRIVATE OB R&B

## 2024-06-09 PROCEDURE — 6360000002 HC RX W HCPCS: Performed by: ANESTHESIOLOGY

## 2024-06-09 PROCEDURE — 86780 TREPONEMA PALLIDUM: CPT

## 2024-06-09 PROCEDURE — 86900 BLOOD TYPING SEROLOGIC ABO: CPT

## 2024-06-09 RX ORDER — LIDOCAINE HYDROCHLORIDE AND EPINEPHRINE 15; 5 MG/ML; UG/ML
INJECTION, SOLUTION EPIDURAL PRN
Status: DISCONTINUED | OUTPATIENT
Start: 2024-06-09 | End: 2024-06-10 | Stop reason: SDUPTHER

## 2024-06-09 RX ORDER — NALOXONE HYDROCHLORIDE 0.4 MG/ML
INJECTION, SOLUTION INTRAMUSCULAR; INTRAVENOUS; SUBCUTANEOUS PRN
Status: DISCONTINUED | OUTPATIENT
Start: 2024-06-09 | End: 2024-06-10

## 2024-06-09 RX ORDER — VALACYCLOVIR HYDROCHLORIDE 500 MG/1
500 TABLET, FILM COATED ORAL 2 TIMES DAILY
Status: DISCONTINUED | OUTPATIENT
Start: 2024-06-09 | End: 2024-06-10

## 2024-06-09 RX ORDER — SODIUM CHLORIDE, SODIUM LACTATE, POTASSIUM CHLORIDE, AND CALCIUM CHLORIDE .6; .31; .03; .02 G/100ML; G/100ML; G/100ML; G/100ML
1000 INJECTION, SOLUTION INTRAVENOUS PRN
Status: DISCONTINUED | OUTPATIENT
Start: 2024-06-09 | End: 2024-06-10 | Stop reason: HOSPADM

## 2024-06-09 RX ORDER — ACETAMINOPHEN 500 MG
1000 TABLET ORAL EVERY 8 HOURS SCHEDULED
Status: DISCONTINUED | OUTPATIENT
Start: 2024-06-09 | End: 2024-06-10

## 2024-06-09 RX ORDER — ROPIVACAINE HYDROCHLORIDE 2 MG/ML
INJECTION, SOLUTION EPIDURAL; INFILTRATION; PERINEURAL
Status: COMPLETED
Start: 2024-06-09 | End: 2024-06-09

## 2024-06-09 RX ORDER — SODIUM CHLORIDE 9 MG/ML
INJECTION, SOLUTION INTRAVENOUS PRN
Status: DISCONTINUED | OUTPATIENT
Start: 2024-06-09 | End: 2024-06-10

## 2024-06-09 RX ORDER — ONDANSETRON 2 MG/ML
4 INJECTION INTRAMUSCULAR; INTRAVENOUS EVERY 6 HOURS PRN
Status: DISCONTINUED | OUTPATIENT
Start: 2024-06-09 | End: 2024-06-10

## 2024-06-09 RX ORDER — SODIUM CHLORIDE 0.9 % (FLUSH) 0.9 %
5-40 SYRINGE (ML) INJECTION EVERY 12 HOURS SCHEDULED
Status: DISCONTINUED | OUTPATIENT
Start: 2024-06-09 | End: 2024-06-10

## 2024-06-09 RX ORDER — SODIUM CHLORIDE, SODIUM LACTATE, POTASSIUM CHLORIDE, CALCIUM CHLORIDE 600; 310; 30; 20 MG/100ML; MG/100ML; MG/100ML; MG/100ML
INJECTION, SOLUTION INTRAVENOUS CONTINUOUS
Status: DISCONTINUED | OUTPATIENT
Start: 2024-06-09 | End: 2024-06-10 | Stop reason: HOSPADM

## 2024-06-09 RX ORDER — LIDOCAINE HYDROCHLORIDE 10 MG/ML
INJECTION, SOLUTION EPIDURAL; INFILTRATION; INTRACAUDAL; PERINEURAL PRN
Status: DISCONTINUED | OUTPATIENT
Start: 2024-06-09 | End: 2024-06-10 | Stop reason: SDUPTHER

## 2024-06-09 RX ORDER — SODIUM CHLORIDE, SODIUM LACTATE, POTASSIUM CHLORIDE, AND CALCIUM CHLORIDE .6; .31; .03; .02 G/100ML; G/100ML; G/100ML; G/100ML
500 INJECTION, SOLUTION INTRAVENOUS PRN
Status: DISCONTINUED | OUTPATIENT
Start: 2024-06-09 | End: 2024-06-10 | Stop reason: HOSPADM

## 2024-06-09 RX ORDER — ACETAMINOPHEN 500 MG
1000 TABLET ORAL EVERY 6 HOURS PRN
Status: DISCONTINUED | OUTPATIENT
Start: 2024-06-09 | End: 2024-06-10

## 2024-06-09 RX ORDER — ONDANSETRON 4 MG/1
4 TABLET, ORALLY DISINTEGRATING ORAL EVERY 6 HOURS PRN
Status: DISCONTINUED | OUTPATIENT
Start: 2024-06-09 | End: 2024-06-10

## 2024-06-09 RX ORDER — ONDANSETRON 4 MG/1
4 TABLET, ORALLY DISINTEGRATING ORAL EVERY 8 HOURS PRN
Status: DISCONTINUED | OUTPATIENT
Start: 2024-06-09 | End: 2024-06-10

## 2024-06-09 RX ORDER — SODIUM CHLORIDE 0.9 % (FLUSH) 0.9 %
5-40 SYRINGE (ML) INJECTION PRN
Status: DISCONTINUED | OUTPATIENT
Start: 2024-06-09 | End: 2024-06-10

## 2024-06-09 RX ADMIN — LIDOCAINE HYDROCHLORIDE,EPINEPHRINE BITARTRATE 3 ML: 15; .005 INJECTION, SOLUTION EPIDURAL; INFILTRATION; INTRACAUDAL; PERINEURAL at 22:10

## 2024-06-09 RX ADMIN — LIDOCAINE HYDROCHLORIDE,EPINEPHRINE BITARTRATE 2 ML: 15; .005 INJECTION, SOLUTION EPIDURAL; INFILTRATION; INTRACAUDAL; PERINEURAL at 22:14

## 2024-06-09 RX ADMIN — ROPIVACAINE HYDROCHLORIDE 5 ML: 2 INJECTION, SOLUTION EPIDURAL; INFILTRATION at 22:17

## 2024-06-09 RX ADMIN — LIDOCAINE HYDROCHLORIDE 3 ML: 10 INJECTION, SOLUTION EPIDURAL; INFILTRATION; INTRACAUDAL; PERINEURAL at 22:05

## 2024-06-09 RX ADMIN — ROPIVACAINE HYDROCHLORIDE 10 ML/HR: 2 INJECTION, SOLUTION EPIDURAL; INFILTRATION at 22:18

## 2024-06-09 NOTE — PROGRESS NOTES
0230 Neha pages with contractions since 2pm yesterday. States that some of them are very painful but some are mild and they have not progressed overnight.     0730 contractions continue but have not yet changed. Planning to relax at home and watch for progression. Warning signs again reviewed.     Rosy DAVENPORT

## 2024-06-09 NOTE — PROGRESS NOTES
OB/GYN MMW Resident Involvement Note     Date: 2024       Time: 9:10 PM   Patient Name: Neha Kolb     Patient : 1995  Room/Bed: 0708/0708-01    Admission Date/Time: 2024  5:49 PM      HPI: Neha Kolb is a 28 y.o.  at 40w1d who presents to L&D complaining of contractions. The patient reports fetal movement is present, complains of contractions every 3-6 minutes, denies loss of fluid, denies vaginal bleeding.  Patient denies RUQ pain, nausea, vomitting, vision changes, HA.    Patients pregnancy is complicated by elevated 1 hour GTT, history of HSV.    DATING:  LMP: Patient's last menstrual period was 2023.  Estimated Date of Delivery: 24   Based on: early ultrasound, at 5 5/7 weeks GA    PREGNANCY RISK FACTORS:  Patient Active Problem List   Diagnosis    Genital herpes simplex    H/O bilateral breast implants    Supervision of normal first pregnancy, antepartum    Rh negative state in antepartum period    Low-lying placenta (RESOLVED)    Elevated 1 hour, 3 hour WNL    Susceptible to varicella (non-immune), currently pregnant    Tdap vaccination RECEIVED    40 weeks gestation of pregnancy        Steroids Given In This Pregnancy:  no     REVIEW OF SYSTEMS:   Constitutional: negative fever, negative chills, negative weight changes   HEENT: negative visual disturbances, negative headaches, negative dizziness, negative hearing loss  Breast: Negative breast abnormalities, negative breast lumps, negative nipple discharge  Respiratory: negative dyspnea, negative cough, negative SOB  Cardiovascular: negative chest pain,  negative palpitations, negative arrhythmia, negative syncope   Gastrointestinal: + abdominal pain, negative RUQ pain, negative N/V, negative diarrhea, negative constipation, negative bowel changes, negative heartburn   Genitourinary: negative dysuria, negative hematuria, negative urinary incontinence, negative vaginal discharge, negative vaginal bleeding

## 2024-06-09 NOTE — H&P
[facility] Obstetrics History and Physical  2024  7:26 PM    SUBJECTIVE:    CHIEF COMPLAINT:  contractions    HISTORY OF PRESENT ILLNESS:      The patient is a 28 y.o. female at 40w1d.    Neha presents with a chief complaint as above and is being admitted for spontaneous labor.     Course of pregnancy complicated by:     Patient Active Problem List   Diagnosis    Genital herpes simplex    H/O bilateral breast implants    Supervision of normal first pregnancy, antepartum    Rh negative state in antepartum period    Low-lying placenta (RESOLVED)    Elevated 1 hour, 3 hour WNL    Susceptible to varicella (non-immune), currently pregnant    Tdap vaccination RECEIVED    40 weeks gestation of pregnancy       OBJECTIVE:     Estimated Due Date:   Estimated Date of Delivery: 24   Patient's last menstrual period was 2023.    Confirmed by: Early US at 5 weeks    PRENATAL LABS:    Blood Type/Rh: O neg  Antibody Screen: negative  Hemoglobin, Hematocrit, Platelets: 12.5 / 36.7 / 144  Rubella: immune  T. Pallidum, IgG: non-reactive   Hepatitis B Surface Antigen: non-reactive   HIV: non-reactive   Sickle Cell Screen: negative  Gonorrhea: negative  Chlamydia: negative  Urine culture: negative, date: 11/15/23    1 hour Glucose Tolerance Test: 131  3 hour Glucose Tolerance Test: Fastin; 1 hour: 127; 2 hour  90; 3 hour: 82    Group B Strep: negative  Cystic Fibrosis Screen: negative  First Trimester Screen: patient declined  MSAFP/Multiple Markers: patient declined  Non-Invasive Prenatal Testing: normal     Steroids:  no      PAST OB HISTORY:  OB History    Para Term  AB Living   1 0 0 0 0 0   SAB IAB Ectopic Molar Multiple Live Births   0 0 0 0 0 0      # Outcome Date GA Lbr Antwan/2nd Weight Sex Delivery Anes PTL Lv   1 Current               Obstetric Comments   G1- Fob-Osei       Past Medical History:        Diagnosis Date    Drug effect     HSV (herpes simplex virus)

## 2024-06-10 LAB
BARBITURATES UR QL SCN: NEGATIVE
BENZODIAZ UR QL: NEGATIVE
CANNABINOIDS UR QL SCN: NEGATIVE
COCAINE UR QL SCN: NEGATIVE
FENTANYL UR QL: NEGATIVE
METHADONE UR QL: NEGATIVE
OPIATES UR QL SCN: NEGATIVE
OXYCODONE UR QL SCN: NEGATIVE
PCP UR QL SCN: NEGATIVE
RESULT: NORMAL
TEST INFORMATION: NORMAL

## 2024-06-10 PROCEDURE — 36415 COLL VENOUS BLD VENIPUNCTURE: CPT

## 2024-06-10 PROCEDURE — 6370000000 HC RX 637 (ALT 250 FOR IP): Performed by: STUDENT IN AN ORGANIZED HEALTH CARE EDUCATION/TRAINING PROGRAM

## 2024-06-10 PROCEDURE — 59400 OBSTETRICAL CARE: CPT

## 2024-06-10 PROCEDURE — 7200000001 HC VAGINAL DELIVERY

## 2024-06-10 PROCEDURE — 85461 HEMOGLOBIN FETAL: CPT

## 2024-06-10 PROCEDURE — 2580000003 HC RX 258: Performed by: STUDENT IN AN ORGANIZED HEALTH CARE EDUCATION/TRAINING PROGRAM

## 2024-06-10 PROCEDURE — 1220000000 HC SEMI PRIVATE OB R&B

## 2024-06-10 PROCEDURE — 80307 DRUG TEST PRSMV CHEM ANLYZR: CPT

## 2024-06-10 PROCEDURE — 0HQ9XZZ REPAIR PERINEUM SKIN, EXTERNAL APPROACH: ICD-10-PCS

## 2024-06-10 RX ORDER — ACETAMINOPHEN 500 MG
1000 TABLET ORAL EVERY 6 HOURS PRN
Qty: 60 TABLET | Refills: 0 | Status: SHIPPED | OUTPATIENT
Start: 2024-06-10 | End: 2024-07-10

## 2024-06-10 RX ORDER — DOCUSATE SODIUM 100 MG/1
100 CAPSULE, LIQUID FILLED ORAL 2 TIMES DAILY
Status: DISCONTINUED | OUTPATIENT
Start: 2024-06-10 | End: 2024-06-11 | Stop reason: HOSPADM

## 2024-06-10 RX ORDER — IBUPROFEN 600 MG/1
600 TABLET ORAL EVERY 6 HOURS PRN
Qty: 60 TABLET | Refills: 0 | Status: SHIPPED | OUTPATIENT
Start: 2024-06-10

## 2024-06-10 RX ORDER — ONDANSETRON 2 MG/ML
4 INJECTION INTRAMUSCULAR; INTRAVENOUS EVERY 4 HOURS PRN
Status: DISCONTINUED | OUTPATIENT
Start: 2024-06-10 | End: 2024-06-11 | Stop reason: HOSPADM

## 2024-06-10 RX ORDER — IBUPROFEN 600 MG/1
600 TABLET ORAL EVERY 6 HOURS PRN
Status: DISCONTINUED | OUTPATIENT
Start: 2024-06-10 | End: 2024-06-11 | Stop reason: HOSPADM

## 2024-06-10 RX ORDER — SODIUM CHLORIDE 0.9 % (FLUSH) 0.9 %
5-40 SYRINGE (ML) INJECTION EVERY 12 HOURS SCHEDULED
Status: DISCONTINUED | OUTPATIENT
Start: 2024-06-10 | End: 2024-06-11 | Stop reason: HOSPADM

## 2024-06-10 RX ORDER — AMOXICILLIN 250 MG
1 CAPSULE ORAL DAILY
Qty: 60 TABLET | Refills: 0 | Status: SHIPPED | OUTPATIENT
Start: 2024-06-10 | End: 2024-08-09

## 2024-06-10 RX ORDER — LANOLIN 72 %
OINTMENT (GRAM) TOPICAL PRN
Status: DISCONTINUED | OUTPATIENT
Start: 2024-06-10 | End: 2024-06-11 | Stop reason: HOSPADM

## 2024-06-10 RX ORDER — SIMETHICONE 80 MG
80 TABLET,CHEWABLE ORAL EVERY 6 HOURS PRN
Status: DISCONTINUED | OUTPATIENT
Start: 2024-06-10 | End: 2024-06-11 | Stop reason: HOSPADM

## 2024-06-10 RX ORDER — SODIUM CHLORIDE 0.9 % (FLUSH) 0.9 %
5-40 SYRINGE (ML) INJECTION PRN
Status: DISCONTINUED | OUTPATIENT
Start: 2024-06-10 | End: 2024-06-11 | Stop reason: HOSPADM

## 2024-06-10 RX ORDER — SODIUM CHLORIDE 9 MG/ML
INJECTION, SOLUTION INTRAVENOUS PRN
Status: DISCONTINUED | OUTPATIENT
Start: 2024-06-10 | End: 2024-06-11 | Stop reason: HOSPADM

## 2024-06-10 RX ORDER — ACETAMINOPHEN 500 MG
1000 TABLET ORAL EVERY 6 HOURS PRN
Status: DISCONTINUED | OUTPATIENT
Start: 2024-06-10 | End: 2024-06-11 | Stop reason: HOSPADM

## 2024-06-10 RX ORDER — BISACODYL 10 MG
10 SUPPOSITORY, RECTAL RECTAL DAILY PRN
Status: DISCONTINUED | OUTPATIENT
Start: 2024-06-10 | End: 2024-06-11 | Stop reason: HOSPADM

## 2024-06-10 RX ADMIN — IBUPROFEN 600 MG: 600 TABLET, FILM COATED ORAL at 08:56

## 2024-06-10 RX ADMIN — IBUPROFEN 600 MG: 600 TABLET, FILM COATED ORAL at 19:55

## 2024-06-10 RX ADMIN — IBUPROFEN 600 MG: 600 TABLET, FILM COATED ORAL at 03:00

## 2024-06-10 RX ADMIN — DOCUSATE SODIUM 100 MG: 100 CAPSULE, LIQUID FILLED ORAL at 08:56

## 2024-06-10 RX ADMIN — DOCUSATE SODIUM 100 MG: 100 CAPSULE, LIQUID FILLED ORAL at 19:55

## 2024-06-10 RX ADMIN — SODIUM CHLORIDE, PRESERVATIVE FREE 10 ML: 5 INJECTION INTRAVENOUS at 08:56

## 2024-06-10 ASSESSMENT — PAIN SCALES - GENERAL
PAINLEVEL_OUTOF10: 2
PAINLEVEL_OUTOF10: 2
PAINLEVEL_OUTOF10: 1

## 2024-06-10 ASSESSMENT — PAIN DESCRIPTION - LOCATION
LOCATION: ABDOMEN

## 2024-06-10 ASSESSMENT — PAIN DESCRIPTION - DESCRIPTORS
DESCRIPTORS: DISCOMFORT;CRAMPING
DESCRIPTORS: CRAMPING
DESCRIPTORS: CRAMPING;DISCOMFORT

## 2024-06-10 ASSESSMENT — PAIN DESCRIPTION - FREQUENCY
FREQUENCY: INTERMITTENT
FREQUENCY: INTERMITTENT

## 2024-06-10 ASSESSMENT — PAIN DESCRIPTION - PAIN TYPE
TYPE: ACUTE PAIN
TYPE: ACUTE PAIN

## 2024-06-10 ASSESSMENT — PAIN - FUNCTIONAL ASSESSMENT
PAIN_FUNCTIONAL_ASSESSMENT: ACTIVITIES ARE NOT PREVENTED

## 2024-06-10 ASSESSMENT — PAIN DESCRIPTION - ORIENTATION
ORIENTATION: MID
ORIENTATION: LOWER
ORIENTATION: MID

## 2024-06-10 NOTE — DISCHARGE INSTRUCTIONS
decongestant sprays) before you take your blood pressure.  Avoid taking your blood pressure if you have just exercised or if you're nervous or upset. Rest at least 15 minutes before you take your blood pressure.  Take your medicines exactly as prescribed. Call your doctor if you think you are having a problem with your medicine.  If you smoke, try to quit. Talk to your doctor if you need help quitting.  Eat a variety of healthy foods. Include plenty of foods high in calcium, such as dairy products, almonds, and dark leafy greens.  Long-term health  After you've had preeclampsia, you have an increased risk of high blood pressure, heart disease, stroke, and kidney disease. This may be because the same things that cause preeclampsia also cause heart and kidney disease.  To protect your health, work with your doctor on living a heart-healthy lifestyle and getting the checkups you need. Your doctor may also want you to check your blood pressure at home.  Follow-up care is a key part of your treatment and safety. Be sure to make and go to all appointments, and call your doctor if you are having problems. It's also a good idea to know your test results and keep a list of the medicines you take.  When should you call for help?  Share this information with your partner or a friend. They can help you watch for warning signs.  Call 911  anytime you think you may need emergency care. For example, call if:    You passed out (lost consciousness).     You have a seizure.     You have trouble breathing.     You have chest pain.   Call your doctor now or seek immediate medical care if:    You have symptoms of preeclampsia, such as:  Sudden swelling of your face, hands, or feet.  New vision problems (such as dimness, blurring, or seeing spots).  A severe headache.     Your blood pressure is very high, such as 160/110 or higher.     Your blood pressure is higher than your doctor told you it should be, or it rises quickly.     You have

## 2024-06-10 NOTE — CARE COORDINATION
CASE MANAGEMENT POST-PARTUM TRANSITIONAL CARE PLAN    40 weeks gestation of pregnancy [Z3A.40]    OB Provider: MMW    Writer met w/ Neha and her  Osei at her bedside to discuss DCP. She is S/P  on 6/10/24 @ 40w2d at 0021 of female infant    Writer updated address as it was incorrect on facesheet, phone number and emergency contacts correct on facesheet. She states she lives with her  Osei. She denied barriers with transportation home, to doctors appointments or with paying for medications upon discharge home.     [facility] insurance correct. Writer notified them they have 30 days from date of birth to add  to insurance policy. They verbalized understanding.    Infant name on BC: Chica.   [facility] PCP [facility].     DME: None  HOME CARE: None    Anticipate DC home of couplet in private vehicle in 1-2 days status post vaginal delivery.    Readmission Risk              Risk of Unplanned Readmission:  4

## 2024-06-10 NOTE — PROGRESS NOTES
[facility] Labor Note    SUBJECTIVE:    Patient is working well with labor. Patient called out with an increase in vaginal pressure. Support system helpful.     OBJECTIVE:     VS:  height is 1.676 m (5' 6\") and weight is 78.9 kg (174 lb). Her axillary temperature is 98.6 °F (37 °C). Her blood pressure is 110/76 and her pulse is 87. Her respiration is 16 and oxygen saturation is 98%.     FHT:    Baseline: 130   Variability: moderate              Accels: present   Decels: Absent    Scalp electrode: No    Contraction pattern:                                  Frequency: 2-4                                 Duration:                                  Intensity: Firm                                 Pitocin: No                                 IUPC:  No    Cervix:             Dilation: 9            Effacement: 100            Station: 0      Status of membranes: SROM x 3 hours     Pain control: Epidural infusing        ASSESSMENT/PLAN:  Neha Kolb is a 28 y.o. female   At 40w1d Spontaneous Labor  GBS negative, no need for prophylaxis  IV saline lock   cEFM/toco   SVE 9/100/0  Expectant management  Anticipate   Residents involved and updated   Dr. Tee, in house attending  - Coping well with labor  - Continue support  FHR: Category 1     PADMINI Fleming-SATYA

## 2024-06-10 NOTE — ANESTHESIA POSTPROCEDURE EVALUATION
Department of Anesthesiology  Postprocedure Note    Patient: Neha Kolb  MRN: [id number]  YOB: 1995  Date of evaluation: 6/10/2024    Procedure Summary       Date: 06/09/24 Room / Location:     Anesthesia Start: 2155 Anesthesia Stop: 06/10/24 0021    Procedure: Labor Analgesia Diagnosis:     Scheduled Providers:  Responsible Provider: Marty Del Real MD    Anesthesia Type: epidural ASA Status: 2            Anesthesia Type: No value filed.    Tabatha Phase I:      Tabatha Phase II:      Anesthesia Post Evaluation    Patient location during evaluation: floor  Patient participation: complete - patient participated  Level of consciousness: awake  Pain score: 2  Airway patency: patent  Nausea & Vomiting: no nausea and vomiting  Cardiovascular status: blood pressure returned to baseline  Respiratory status: acceptable  Hydration status: euvolemic  Pain management: adequate        No notable events documented.

## 2024-06-10 NOTE — LACTATION NOTE
INPATIENT CONSULT    Maternal /para status: primip     Current pregnancy:    Gestational age: 40 2/7 weeks     C/section or vaginal delivery:       Birth weight: 3625  7# 15oz      Plan for feeding: breast      Breast pump at home: yes        Assessment of breastfeeding:  Taught cross cradle and football.  Mom tends to aim nipple down towards mouth, demonstrated how to aim for roof of baby's mouth and lead with baby's chin. Baby fussy and won't sustain suck.  Taught hand expression and spoon fed baby 2ml EBM.  Baby placed skin to skin.         Reviewed:   - Breastfeeding packet  - Expectations for normal  feeding   - Hand expression  - Deep latch/milk transfer  - Cues for feeding (early/late)     Encouraged:   - Frequent skin to skin with mom or dad  - Frequent attempts to feed  - Calling for assistance as needed

## 2024-06-10 NOTE — CARE COORDINATION
Social Work    Repeat ALVARO collected today at 7:41am on 6./10 (@ 10 hours after initial alvaro) is negative.      Sw  made referral to [facility] ([facility] with all details, including negative alvaro today.      Sw unaware if [facility] will reach out to family, but Sw has no current concerns, baby is clear to dc home with parents.

## 2024-06-10 NOTE — PROGRESS NOTES
CNM to patient room to discuss finding of positive opiate screen on UDS on admission. Patient reports that she did eat an everything bagel the morning of her going into labor. She denies any current or past drug use.    Decision made to send another UDS to lab to retest.

## 2024-06-10 NOTE — PROGRESS NOTES
Obstetric/Gynecology Resident Interval Note    Resident present during . After delivery of fetus and placenta, the vagina was swept for clots and inspection for lacerations took place. Bilateral labial lacerations were found and noted to be hemostatic, not requiring repair. A 1st degree vaginal laceration was found and repaired in a standard fashion with 3-0 Vicryl.       Amy Bennett DO  OB/GYN Resident, PGY2  [place], [place]  6/10/2024, 12:40 AM

## 2024-06-10 NOTE — PROGRESS NOTES
Obstetric/Gynecology Resident Interval Note    Resident to bedside to evaluate patient, but patient up and walking in halls. Patient breathing through contractions. Declines pain management at this time.    Vitals:    06/09/24 1803 06/09/24 1806 06/09/24 2010   BP: 122/70  118/75   Pulse: 94  78   Resp: 18  16   Temp: 97.8 °F (36.6 °C)  98.6 °F (37 °C)   TempSrc: Oral  Axillary   SpO2: 97%  98%   Weight:  78.9 kg (174 lb)    Height:  1.676 m (5' 6\")          Fetal Heart Monitor:  Baseline Heart Rate 145, moderate variability, present accelerations, absent decelerations  [place]: contractions, regular, every 2 minutes    Period of poor FHT tracing from 2104 to 2113 is secondary to patient up and ambulating halls.       Will continue to monitor.       Amy Bennett DO  OB/GYN Resident, PGY2  [place], [place]  6/9/2024, 9:16 PM

## 2024-06-10 NOTE — PROGRESS NOTES
OB/GYN Resident Interval Note    Spoke with patient about UDS positive for opiates. Patient states she ate a poppyseed bagel for breakfast this AM and denies using opiates. Patient with negative UDS in pregnancy otherwise. Will send off urine for confirmatory testing.    Odilia Dutton, DO  OB/GYN Resident

## 2024-06-10 NOTE — PLAN OF CARE
Problem: Pain  Goal: Verbalizes/displays adequate comfort level or baseline comfort level  Outcome: Progressing  Flowsheets (Taken 6/10/2024 025)  Verbalizes/displays adequate comfort level or baseline comfort level:   Encourage patient to monitor pain and request assistance   Assess pain using appropriate pain scale   Administer analgesics based on type and severity of pain and evaluate response   Implement non-pharmacological measures as appropriate and evaluate response     Problem: Postpartum  Goal: Experiences normal postpartum course  Description:  Postpartum OB-Pregnancy care plan goal which identifies if the mother is experiencing a normal postpartum course  Outcome: Progressing  Goal: Appropriate maternal -  bonding  Description:  Postpartum OB-Pregnancy care plan goal which identifies if the mother and  are bonding appropriately  Outcome: Progressing  Goal: Establishment of infant feeding pattern  Description:  Postpartum OB-Pregnancy care plan goal which identifies if the mother is establishing a feeding pattern with their   Outcome: Progressing  Goal: Incisions, wounds, or drain sites healing without S/S of infection  Outcome: Progressing     Problem: Safety - Adult  Goal: Free from fall injury  Outcome: Progressing     Problem: Discharge Planning  Goal: Discharge to home or other facility with appropriate resources  Outcome: Progressing

## 2024-06-10 NOTE — DISCHARGE SUMMARY
Obstetric Discharge Summary  [facility]    Patient Name: Neha Kolb  Patient : 1995  Primary Care Physician: Rosanne Kiran APRN - CNP  Admit Date: 2024    Principal Diagnosis: IUP at 40w1d, admitted for spontaneous labor     Her pregnancy has been complicated by:   Patient Active Problem List   Diagnosis    Genital herpes simplex    H/O bilateral breast implants    Supervision of normal first pregnancy, antepartum    Rh negative state in antepartum period    Low-lying placenta (RESOLVED)    Elevated 1 hour, 3 hour WNL    Susceptible to varicella (non-immune), currently pregnant    Tdap vaccination RECEIVED    40 weeks gestation of pregnancy     6/10/24 F Apg  Wt 7#15       Infection Present?: No  Hospital Acquired: N/A    Surgical Operations & Procedures:  Analgesia: epidural  Delivery Type: Spontaneous Vaginal Delivery: See Labor and Delivery Summary   Laceration(s): First degree laceration.  Suture used for repair:  Vicryl 3.0.    Consultations: Anesthesia    Pertinent Findings & Procedures:   Neha Kolb is a 28 y.o. female  at 40w1d admitted for spontaneous labor; received SROM (clear), epidural.     She delivered by spontaneous vaginal a Live Born infant on 6/10/24.       Information for the patient's :  John Kolb [[id number]]   female   Birth Weight: 3.625 kg (7 lb 15.9 oz)     Apgars: 9 at 1 minute and 9 at 5 minutes.       Postpartum course: normal.      Course of patient: uncomplicated    Discharge to: Home    Readmission planned: no     Indication for 6 week PP 2 hour GTT?: no     Eligible for 2 week PP virtual visit? no - Midwife patient    Contraception: no method    Recommendations on Discharge:     Medications:      Medication List        START taking these medications      acetaminophen 500 MG tablet  Commonly known as: TYLENOL  Take 2 tablets by mouth every 6 hours as needed for Pain     ibuprofen 600 MG tablet  Commonly

## 2024-06-10 NOTE — CARE COORDINATION
Social Work     Sw reviewed medical record (current active problem list) and tox screens and found mom is + opiates at time of delivery.  Medical record shows this was discussed with mom via midwife, mom states she ate an everything bagel (poppy seeds) morning of delivery.    Sw met with mom and dad, mom states she eats everything bagels from [facility] regularly.  Mom denied any illicit drug use.      [facility] record review shoes no history or concern for drug use.  Sw has no concerns at this time after meeting with mom and dad.     Parents are aware of GLADYS Mandate (fully informed and discussed, in case [facility] does check in).       Sw spoke with mom and dad briefly to explain Sw role, inquire if any needs or concerns, and provide safe sleep education and discuss.  Mom denied any needs or questions and informs baby has a safe sleep environment (bassinet and crib).     Mom denied any current s/s of anxiety or depression and is aware to reach out to OB if any s/s occur after dc.     Mom reports a really good support system with fob and excited family and denied any current questions or needs.      Mom reports this is her 1st baby.       Mom states ped will be [facility].      Notes reflect another ALVARO ordered, Sw will await result before contacting [facility] for GLADYS Mandate.     Sw encouraged parents to reach out if any issues or concerns arise.

## 2024-06-10 NOTE — L&D DELIVERY NOTE
Live Births              Obstetric Comments   G1- Fob-Osei            who was admitted for spontaneous labor.     She received the following interventions:   None     The patient progressed, did receive an epidural, became Complete and started to push with strong effort.  She progressed to spontaneous vaginal delivery of viable female infant, who was delivered atraumatically, shoulders easily delivered and placed on mother's abdomen. The cord clamping was delayed. The cord was clamped and cut and cord blood obtained. The delivery of the placenta was spontaneous,  Schultze presentation. Placental examination revealed a grossly intact placenta with a  3 vessel cord. The uterus was massaged to firm and contracted immediately, with bleeding under control.  IV  Pitocin infusion started.  The perineum and vagina were inspected and vaginal (right) laceration was found. The laceration was  bleeding and repaired using a 3-0 on a CT by Dr. Bennett. Mother and baby stable. Baby skin to skin with mom.     ISSAC Fleming Dr. was present for entire delivery.

## 2024-06-10 NOTE — PROGRESS NOTES
Labor Progress Note    Neha Kolb is a 28 y.o. female  at 40w1d    FHT & Vitals review.     Vital Signs:  Vitals:    24 1803 24 1806 24   BP: 122/70  118/75   Pulse: 94  78   Resp: 18  16   Temp: 97.8 °F (36.6 °C)  98.6 °F (37 °C)   TempSrc: Oral  Axillary   SpO2: 97%  98%   Weight:  78.9 kg (174 lb)    Height:  1.676 m (5' 6\")          FHT:  135 , moderate variability, accelerations present, decelerations absent  Contractions: regular, every 3-4 minutes      Membranes: Intact  Scalp Electrode in place: absent  Intrauterine Pressure Catheter in Place: absent    Interventions: None     Assessment/Plan:  Neha Kolb is a 28 y.o. female  at 40w1d admitted for Spontaneous Labor    - GBS negative, No indication for GBS prophylaxis   - VSS, Afebrile    - cEFM/TOCO with Cat I tracing; TOCO showing contractions every 2-3 minutes    - SROM (clr) @ 0257   - Continue expectant management    - Epidural ordered     Amy Bennett DO  Ob/Gyn Resident  2024, 10:36 PM

## 2024-06-10 NOTE — ANESTHESIA PROCEDURE NOTES
Epidural Block    Patient location during procedure: OB  Reason for block: labor epidural  Staffing  Performed: resident/CRNA   Anesthesiologist: Marty Del Real MD  Resident/CRNA: Neris Reardon APRN - CRNA  Performed by: Neris Reardon APRN - CRNA  Authorized by: Marty Del Real MD    Epidural  Patient position: sitting  Prep: Betadine  Patient monitoring: continuous pulse ox and frequent blood pressure checks  Approach: midline  Location: L3-4  Injection technique: EVERARDO saline  Provider prep: mask and sterile gloves  Needle  Needle type: Tuohy   Needle gauge: 17 G  Needle length: 3.5 in  Needle insertion depth: 5 cm  Catheter type: side hole  Catheter size: 19 G  Catheter at skin depth: 14 cm  Test dose: negativeCatheter Secured: tegaderm and tape  Assessment  Sensory level: T10  Hemodynamics: stable  Attempts: 1  Outcomes: uncomplicated and patient tolerated procedure well  Preanesthetic Checklist  Completed: patient identified, IV checked, site marked, risks and benefits discussed, surgical/procedural consents, equipment checked, pre-op evaluation, timeout performed, anesthesia consent given, oxygen available, monitors applied/VS acknowledged, fire risk safety assessment completed and verbalized and blood product R/B/A discussed and consented

## 2024-06-10 NOTE — PROGRESS NOTES
Patient called out with complaints of leaking fluid. CNM to room for SVE and revealed 6cm/80%/-1 with clear fluid leaking during check.     Plan is to continue expectant management and to anticipate an .     Residents updated.

## 2024-06-10 NOTE — ANESTHESIA PRE PROCEDURE
BP Readings from Last 3 Encounters:   06/09/24 118/75   06/03/24 104/62   05/30/24 104/60       NPO Status:                                                                                 BMI:   Wt Readings from Last 3 Encounters:   06/09/24 78.9 kg (174 lb)   06/03/24 79.1 kg (174 lb 4.8 oz)   05/30/24 78.9 kg (173 lb 14.4 oz)     Body mass index is 28.08 kg/m².    CBC:   Lab Results   Component Value Date/Time    WBC 9.9 06/09/2024 06:48 PM    RBC 3.85 06/09/2024 06:48 PM    HGB 12.5 06/09/2024 06:48 PM    HCT 36.7 06/09/2024 06:48 PM    MCV 95.3 06/09/2024 06:48 PM    RDW 14.0 06/09/2024 06:48 PM     06/09/2024 06:48 PM       CMP:   Lab Results   Component Value Date/Time     10/16/2023 08:15 AM    K 4.4 10/16/2023 08:15 AM     10/16/2023 08:15 AM    CO2 25 10/16/2023 08:15 AM    BUN 8 10/16/2023 08:15 AM    CREATININE 0.6 10/16/2023 08:15 AM    GFRAA >60 08/31/2021 08:15 AM    LABGLOM >60 10/16/2023 08:15 AM    GLUCOSE 131 03/05/2024 11:05 PM    GLUCOSE 84 10/16/2023 08:15 AM    CALCIUM 9.1 10/16/2023 08:15 AM    BILITOT 0.3 10/16/2023 08:15 AM    ALKPHOS 37 10/16/2023 08:15 AM    AST 21 10/16/2023 08:15 AM    ALT 14 10/16/2023 08:15 AM       POC Tests: No results for input(s): \"POCGLU\", \"POCNA\", \"POCK\", \"POCCL\", \"POCBUN\", \"POCHEMO\", \"POCHCT\" in the last 72 hours.    Coags: No results found for: \"PROTIME\", \"INR\", \"APTT\"    HCG (If Applicable):   Lab Results   Component Value Date    PREGTESTUR Negative 12/20/2012        ABGs: No results found for: \"PHART\", \"PO2ART\", \"YMD9XIS\", \"JAD2OCM\", \"BEART\", \"U5NXYLAS\"     Type & Screen (If Applicable):  No results found for: \"LABABO\"    Drug/Infectious Status (If Applicable):  Lab Results   Component Value Date/Time    HEPCAB NONREACTIVE 11/15/2023 12:56 AM       COVID-19 Screening (If Applicable): No results found for: \"COVID19\"        Anesthesia Evaluation  Patient summary reviewed and Nursing notes reviewed   no history of

## 2024-06-11 VITALS
HEART RATE: 84 BPM | OXYGEN SATURATION: 100 % | BODY MASS INDEX: 27.97 KG/M2 | SYSTOLIC BLOOD PRESSURE: 107 MMHG | DIASTOLIC BLOOD PRESSURE: 74 MMHG | TEMPERATURE: 98.1 F | RESPIRATION RATE: 16 BRPM | WEIGHT: 174 LBS | HEIGHT: 66 IN

## 2024-06-11 PROCEDURE — 6370000000 HC RX 637 (ALT 250 FOR IP): Performed by: STUDENT IN AN ORGANIZED HEALTH CARE EDUCATION/TRAINING PROGRAM

## 2024-06-11 PROCEDURE — 6360000002 HC RX W HCPCS: Performed by: STUDENT IN AN ORGANIZED HEALTH CARE EDUCATION/TRAINING PROGRAM

## 2024-06-11 PROCEDURE — 96372 THER/PROPH/DIAG INJ SC/IM: CPT

## 2024-06-11 RX ADMIN — HUMAN RHO(D) IMMUNE GLOBULIN 300 MCG: 1500 SOLUTION INTRAMUSCULAR; INTRAVENOUS at 00:19

## 2024-06-11 RX ADMIN — DOCUSATE SODIUM 100 MG: 100 CAPSULE, LIQUID FILLED ORAL at 09:03

## 2024-06-11 RX ADMIN — IBUPROFEN 600 MG: 600 TABLET, FILM COATED ORAL at 04:36

## 2024-06-11 ASSESSMENT — PAIN SCALES - GENERAL: PAINLEVEL_OUTOF10: 0

## 2024-06-11 NOTE — PROGRESS NOTES
[facility]   Vaginal Postpartum Note  Hospital Day: 3  Postpartum Day: 2      SUBJECTIVE:  Voices no concerns today. Doing well. Voiding, ambulating, eating without difficulty. Denies any leg pain. Bonding with baby. Resting well. Lochia is light. Reports pain/cramping and is 0/10 on the pain scale. She reports it is controlled with oral meds. She denies headache, vision changes, RUQ pain, epigastric pain, swelling.    OBJECTIVE:     Vitals:  /68   Pulse 75   Temp 98 °F (36.7 °C) (Oral)   Resp 16   Ht 1.676 m (5' 6\")   Wt 78.9 kg (174 lb)   LMP 2023   SpO2 100%   Breastfeeding Unknown   BMI 28.08 kg/m²     Constitutional:  Awake, alert, cooperative, no apparent distress. Appears to be bonding well with baby, does not appear overly stressed with infant handling.    Pain:  0/10 intermittent abdominal cramping, improves with oral meds.     Breasts:  Soft without tenderness, nipples are intact.    Abdominal Exam: Soft, non-tender, lax muscle tone. Fundus is mid-line, firm @ U/-2.  Non-tender with palpation. Bladder non-distended.    Perineum: not inspected    Anus: not inspected                       Lochia: Small and Rubra    Extremities: Negative Bulmaro sign. Minimal edema.    Voiding QS, no BM yet, passing flatus    Labs:   Lab Results   Component Value Date/Time    HGB 12.5 2024 06:48 PM    HCT 36.7 2024 06:48 PM       ASSESSMENT/PLAN:   Neha Kolb is a  PPD # 2 s/p               - Doing well, VSS              - Female infant in General Care Nursery              - Encourage ambulation              - Postpartum Hgb/Hct if symptomatic              - Motrin/Tylenol prn  Rh negative/Rubella immune              - Rhogam indicated  Breast feeding              - Lactation consult postpartum  Varicella non immune              - Vaccine offered postpartum  UDS+ opiates              - Patient reports due to poppyseed bagels              - Repeat UDS negative

## 2024-06-11 NOTE — LACTATION NOTE
This note was copied from a baby's chart.  Pt states nursing has been going well, did say she felt they struggled overnight. Reassured, reviewed discharge information including feeding expectations, signs of deep latch/transfer, supply and demand, hand expression, when to pump if needed. Encouraged lots of skin to skin, frequent attempts, and to call out for assistance as needed.

## 2024-06-11 NOTE — LACTATION NOTE
Pt states baby just nursed well, no other questions or concerns, encouraged to reach out to lactation as needed.

## 2024-06-11 NOTE — FLOWSHEET NOTE
I have reviewed all [facility] Post-Birth Warning Signs and essential teaching points for pulmonary embolism, cardiac disease, hypertensive disorders of pregnancy, obstetric hemorrhage, venous thromboembolism, infection, and postpartum depression with the patient and FOB (support person) . I have informed the patient on when to call their healthcare provider and when to call 911. I have discussed with the patient  the importance of scheduling a follow-up visit with their physician, nurse practitioner or midwife and provided them with correct contact information for appointment. I have provided the patient with a copy of the \"Save Your Life\" handout. The patient has acknowledged receiving and understanding this education with her signature. ID bands verified, patient discharged to self care with  and infant. Pt questions answered to satisfaction at discharge.

## 2024-06-11 NOTE — PROGRESS NOTES
POST PARTUM DAY # 1    Neha Kolb is a 28 y.o. female  This patient was seen & examined today.  on 6/10/24    Her pregnancy was complicated by:   Patient Active Problem List   Diagnosis    Genital herpes simplex    H/O bilateral breast implants    Supervision of normal first pregnancy, antepartum    Rh negative state in antepartum period    Low-lying placenta (RESOLVED)    Elevated 1 hour, 3 hour WNL    Susceptible to varicella (non-immune), currently pregnant    Tdap vaccination RECEIVED    40 weeks gestation of pregnancy     6/10/24 F Apg 9/9 Wt 7#15       Today she is doing well without any chief complaint. Her lochia is light. She denies chest pain, shortness of breath, headache, lightheadedness and blurred vision. She is breast feeding and she denies any breast tenderness. She is ambulating well. Her voiding pattern is normal. I reviewed signs and symptoms of post partum depression with the patient, she currently denies any of these symptoms. She is tolerating solids.     Vital Signs:  Vitals:    06/10/24 0849 06/10/24 1658 06/10/24 1955 24 0400   BP: 105/70 107/75 102/68 105/68   Pulse: 79 78 82 75   Resp: 18 16 16 16   Temp: 97.8 °F (36.6 °C) 98.7 °F (37.1 °C) 97.9 °F (36.6 °C) 98 °F (36.7 °C)   TempSrc: Oral Oral Oral Oral   SpO2: 100% 100% 98% 100%   Weight:       Height:           Physical Exam:  General:  no apparent distress, alert and cooperative  Neurologic:  alert, oriented, normal speech, no focal findings or movement disorder noted  Lungs:  No increased work of breathing, good air exchange, no crackles or wheezing  Heart:  Normal apical impulse, regular rate and rhythm    Abdomen: abdomen soft, non-distended, non-tender  Fundus: non-tender, firm, below umbilicus  Extremities:  no calf tenderness, non edematous    Lab:  Lab Results   Component Value Date    HGB 12.5 2024     Lab Results   Component Value Date    HCT 36.7 2024       Assessment/Plan:  Neha Kolb is

## 2024-06-11 NOTE — PLAN OF CARE
Problem: Postpartum  Goal: Experiences normal postpartum course  Description:  Postpartum OB-Pregnancy care plan goal which identifies if the mother is experiencing a normal postpartum course  Outcome: Progressing  Goal: Appropriate maternal -  bonding  Description:  Postpartum OB-Pregnancy care plan goal which identifies if the mother and  are bonding appropriately  Outcome: Progressing     Problem: Discharge Planning  Goal: Discharge to home or other facility with appropriate resources  Outcome: Progressing

## 2024-06-11 NOTE — PROGRESS NOTES
CLINICAL PHARMACY NOTE: MEDS TO BEDS    Total # of Prescriptions Filled: 3   The following medications were delivered to the patient:  Senexon  Ibuprofen  tylenol    Additional Documentation:

## 2024-06-12 ENCOUNTER — TELEPHONE (OUTPATIENT)
Dept: FAMILY MEDICINE CLINIC | Age: 29
End: 2024-06-12

## 2024-06-12 LAB
COMPONENT: NORMAL
STATUS OF UNITS: NORMAL
TRANSFUSION STATUS: NORMAL
UNIT DIVISION: 0
UNIT NUMBER: NORMAL

## 2024-06-12 NOTE — TELEPHONE ENCOUNTER
Care Transitions Initial Follow Up Call    Outreach made within 2 business days of discharge: Yes    Patient: Neha Kolb Patient : 1995   MRN: [id number]  Reason for Admission:   Discharge Date: 24       Spoke with: left message to make HFU    Discharge department/facility: [facility] Interactive Patient Contact:  Was patient able to fill all prescriptions:   Was patient instructed to bring all medications to the follow-up visit:   Is patient taking all medications as directed in the discharge summary?   Does patient understand their discharge instructions:   Does patient have questions or concerns that need addressed prior to 7-14 day follow up office visit:     Scheduled appointment with PCP within 7-14 days    Follow Up  Future Appointments   Date Time Provider Department Center   2024 10:40 AM Renuka Suarez APRN - CNM [facility]   10/10/2024  4:00 PM Seal, Rosanne, APRN - CNP [facility]       Sanjuana Mcdonald MA

## 2024-06-25 ENCOUNTER — POSTPARTUM VISIT (OUTPATIENT)
Dept: OBGYN CLINIC | Age: 29
End: 2024-06-25

## 2024-06-25 VITALS
HEIGHT: 66 IN | SYSTOLIC BLOOD PRESSURE: 114 MMHG | DIASTOLIC BLOOD PRESSURE: 76 MMHG | WEIGHT: 157 LBS | BODY MASS INDEX: 25.23 KG/M2

## 2024-06-25 PROBLEM — Z34.00 SUPERVISION OF NORMAL FIRST PREGNANCY, ANTEPARTUM: Status: RESOLVED | Noted: 2023-11-15 | Resolved: 2024-06-25

## 2024-06-25 PROBLEM — O99.810 ABNORMAL GLUCOSE TOLERANCE TEST IN PREGNANCY: Status: RESOLVED | Noted: 2024-03-06 | Resolved: 2024-06-25

## 2024-06-25 PROBLEM — O44.40 LOW-LYING PLACENTA: Status: RESOLVED | Noted: 2024-02-05 | Resolved: 2024-06-25

## 2024-06-25 PROBLEM — O09.899 SUSCEPTIBLE TO VARICELLA (NON-IMMUNE), CURRENTLY PREGNANT: Status: RESOLVED | Noted: 2024-04-02 | Resolved: 2024-06-25

## 2024-06-25 PROBLEM — Z28.39 SUSCEPTIBLE TO VARICELLA (NON-IMMUNE), CURRENTLY PREGNANT: Status: RESOLVED | Noted: 2024-04-02 | Resolved: 2024-06-25

## 2024-06-25 PROBLEM — Z23 NEED FOR TDAP VACCINATION: Status: RESOLVED | Noted: 2024-04-16 | Resolved: 2024-06-25

## 2024-06-25 PROBLEM — Z67.91 RH NEGATIVE STATE IN ANTEPARTUM PERIOD: Status: RESOLVED | Noted: 2023-11-17 | Resolved: 2024-06-25

## 2024-06-25 PROBLEM — O26.899 RH NEGATIVE STATE IN ANTEPARTUM PERIOD: Status: RESOLVED | Noted: 2023-11-17 | Resolved: 2024-06-25

## 2024-06-25 PROBLEM — Z3A.40 40 WEEKS GESTATION OF PREGNANCY: Status: RESOLVED | Noted: 2024-06-09 | Resolved: 2024-06-25

## 2024-06-25 PROCEDURE — 99024 POSTOP FOLLOW-UP VISIT: CPT

## 2024-06-25 NOTE — PROGRESS NOTES
HPI:  DELIVERY DATE: 6/10/24  TYPE OF DELIVERY: normal spontaneous vaginal delivery  PROVIDER: Jairo DAVENPORT  PERINEUM: right vaginal lac repaired    Neha was seen for her 2 week visit.  Her female infant is healthy.  She is breastfeeding with and without difficulty - mostly pumping.  She is not experiencing pain.   She reports her lochia is thin lochia  She reports no perineal discomfort.  She denies urinary incontinence.  Her bowels have returned to her normal pattern.  She is back to her normal activity pattern.  Neha has not engaged in intercourse.  She does not report a mood disorder.    She feels she is not having difficulty coping.  Neha feels her family adjustment is effective.  She reports an overall positive birth experience.    OBJECTIVE:   Wt Readings from Last 3 Encounters:   06/25/24 71.2 kg (157 lb)   06/09/24 78.9 kg (174 lb)   06/03/24 79.1 kg (174 lb 4.8 oz)     Blood pressure 114/76, height 1.676 m (5' 6\"), weight 71.2 kg (157 lb), last menstrual period 08/17/2023, currently breastfeeding.    EPDS: 2     Social Determinants of Health:   Financial Resource Strain: Low Risk  (11/15/2023)    Overall Financial Resource Strain (CARDIA)     Difficulty of Paying Living Expenses: Not hard at all      Food Insecurity: No Food Insecurity (6/9/2024)    Hunger Vital Sign     Worried About Running Out of Food in the Last Year: Never true     Ran Out of Food in the Last Year: Never true      Transportation Needs: No Transportation Needs (6/9/2024)    PRAPARE - Transportation     Lack of Transportation (Medical): No     Lack of Transportation (Non-Medical): No      Intimate Partner Violence: Not At Risk (11/15/2023)    Humiliation, Afraid, Rape, and Kick questionnaire     Fear of Current or Ex-Partner: No     Emotionally Abused: No     Physically Abused: No     Sexually Abused: No       ASSESSMENT/PLAN:    2 week postpartum visit  Labs were not ordered.  Return to the office in 4

## 2024-07-24 ENCOUNTER — POSTPARTUM VISIT (OUTPATIENT)
Dept: OBGYN CLINIC | Age: 29
End: 2024-07-24

## 2024-07-24 VITALS
BODY MASS INDEX: 24.19 KG/M2 | WEIGHT: 149.9 LBS | SYSTOLIC BLOOD PRESSURE: 100 MMHG | DIASTOLIC BLOOD PRESSURE: 62 MMHG | HEART RATE: 63 BPM

## 2024-07-24 PROCEDURE — 0503F POSTPARTUM CARE VISIT: CPT

## 2024-07-24 NOTE — PROGRESS NOTES
Chief Complaint   Patient presents with    Postpartum Care     SUBJECTIVE:    HPI:  DELIVERY DATE: 6/10/2024  TYPE OF DELIVERY: normal spontaneous vaginal delivery  PROVIDER: Jairo DAVENPORT    Neha was seen for her 6 week postpartum visit.  Her Female infant is healthy.  She is exclusively pumping.  She is not experiencing pain.   She reports her lochia is staining only  She reports no perineal discomfort.  She denies urinary incontinence.  Her bowels have returned to her normal pattern.  She is back to her normal activity pattern.  She has not her first menses.   Neha has engaged in intercourse. [place] was not protected.  She does not report a mood disorder.    She feels she is not having difficulty coping.  Neha feels her family adjustment is effective. She reports adequate support system.   She reports an overall positive birth experience.  Her [place] Score is 1.  This score does match my assessment.  She denies headache, vision changes, RUQ pain, epigastric pain, and swelling.     OBJECTIVE:   Wt Readings from Last 3 Encounters:   07/24/24 68 kg (149 lb 14.4 oz)   06/25/24 71.2 kg (157 lb)   06/09/24 78.9 kg (174 lb)       Vitals:    07/24/24 1108   BP: 100/62   Pulse: 63   Weight: 68 kg (149 lb 14.4 oz)      Abdomen: Soft non-tender without guarding.     Extremities: No calf tenderness bilaterally. No edema.    EPDS: 1     Social Determinants of Health:   Financial Resource Strain: Low Risk  (11/15/2023)    Overall Financial Resource Strain (CARDIA)     Difficulty of Paying Living Expenses: Not hard at all      Food Insecurity: No Food Insecurity (6/9/2024)    Hunger Vital Sign     Worried About Running Out of Food in the Last Year: Never true     Ran Out of Food in the Last Year: Never true      Transportation Needs: No Transportation Needs (6/9/2024)    PRAPARE - Transportation     Lack of Transportation (Medical): No     Lack of Transportation (Non-Medical): No      Intimate

## 2024-07-24 NOTE — PROGRESS NOTES
7/24/2024    12:06 PM 6/25/2024    11:56 AM 3/5/2024     1:29 PM   Postpartum Depression Scale   I have been able to laugh and see the funny side of things As much as I always could As much as I always could As much as I always could   I have looked forward with enjoyment to things As much as I ever did As much as I ever did As much as I ever did   I have blamed myself unnecessarily when things went wrong No, never No, never No, never   I have been anxious or worried for no good reason No, not at all No, not at all No, not at all   I have felt scared or panicky for no good reason No, not much No, not at all No, not at all   I haven't been able to cope lately No, I have been coping as well as ever No, most of the time I have coped quite well No, I have been coping as well as ever   I have been so unhappy that I have had difficulty sleeping Not at all Not at all Not very often   I have felt sad or miserable No, not at all Not very often No, not at all   I have been so unhappy that I have been crying No, never Only occasionally No, never   The thought of harming myself has occurred to me Never Never Never   Total 1 3 1

## 2024-08-02 ENCOUNTER — TELEPHONE (OUTPATIENT)
Dept: OBGYN CLINIC | Age: 29
End: 2024-08-02

## 2024-08-02 NOTE — TELEPHONE ENCOUNTER
Writer received a call from pt asking for a return to work letter with no restrictions. Pt needs it before the day is over, just got notified from employers hr department.

## 2024-08-02 NOTE — TELEPHONE ENCOUNTER
Writer spoke with Midwife Jairo who approved return to work letter with no restrictions, writer wrote letter and sent it to sonia kim

## 2024-10-10 ENCOUNTER — OFFICE VISIT (OUTPATIENT)
Dept: FAMILY MEDICINE CLINIC | Age: 29
End: 2024-10-10
Payer: COMMERCIAL

## 2024-10-10 VITALS
HEART RATE: 68 BPM | SYSTOLIC BLOOD PRESSURE: 98 MMHG | DIASTOLIC BLOOD PRESSURE: 60 MMHG | WEIGHT: 145.8 LBS | BODY MASS INDEX: 23.53 KG/M2 | OXYGEN SATURATION: 98 % | TEMPERATURE: 96.8 F

## 2024-10-10 DIAGNOSIS — Z00.00 ENCOUNTER FOR WELL ADULT EXAM WITHOUT ABNORMAL FINDINGS: Primary | ICD-10-CM

## 2024-10-10 PROCEDURE — 99395 PREV VISIT EST AGE 18-39: CPT | Performed by: NURSE PRACTITIONER

## 2024-10-10 ASSESSMENT — PATIENT HEALTH QUESTIONNAIRE - PHQ9
SUM OF ALL RESPONSES TO PHQ QUESTIONS 1-9: 0
1. LITTLE INTEREST OR PLEASURE IN DOING THINGS: NOT AT ALL
SUM OF ALL RESPONSES TO PHQ QUESTIONS 1-9: 0
2. FEELING DOWN, DEPRESSED OR HOPELESS: NOT AT ALL
SUM OF ALL RESPONSES TO PHQ9 QUESTIONS 1 & 2: 0

## 2024-10-10 NOTE — PROGRESS NOTES
Well Adult Note  Name: Neha Kolb Today’s Date: 10/10/2024   MRN: [id number] Sex: Female   Age: 29 y.o. Ethnicity: Non- / Non    : 1995 Race: White (non-)      Neha Kolb is here for a well adult exam.       Subjective   History:    Patient has been doing well.     Exercise: No  Diet: Eating a balanced diet  Smoker: No  Alcohol: No  Sleep: Averages 5-6 hours     Review of Systems   All other systems reviewed and are negative.      Allergies   Allergen Reactions    Amoxicillin Hives    Sulfa Antibiotics Hives     Prior to Visit Medications    Medication Sig Taking? Authorizing Provider   Prenatal Vit-Fe Fumarate-FA (PRENATAL VITAMIN PO) Take by mouth Yes Provider, MD Claudia     Past Medical History:   Diagnosis Date    40 weeks gestation of pregnancy 2024    Drug effect     HSV (herpes simplex virus) anogenital infection     Migraines     Rh negative state in antepartum period 2023    Rhogam @ 28 weeks: given 3/5/2024     Susceptible to varicella (non-immune), currently pregnant 2024    Vaccine pp     Precautions reviewed    Tdap vaccination RECEIVED 2024     Past Surgical History:   Procedure Laterality Date    BREAST SURGERY Bilateral     COSMETIC SURGERY  2018    FRACTURE SURGERY Right 2017     RIGHT 2ND METACARPAL REDUCTION  WITH KWIRE PLACEMENT performed by Jason Grace MD at [facility]    WISDOM TOOTH EXTRACTION       Family History   Problem Relation Age of Onset    Hypertension Maternal Grandmother     Diabetes Maternal Grandmother     Elevated Lipids Maternal Grandmother     Arthritis Maternal Grandmother     Hypertension Maternal Grandfather     Diabetes Maternal Grandfather     Osteoarthritis Maternal Grandfather     Heart Attack Father 59    Heart Surgery Father     No Known Problems Mother     No Known Problems Brother     Stroke Neg Hx     Cancer Neg Hx     Thyroid Disease Neg Hx     Bleeding Prob Neg Hx     Clotting

## 2024-10-22 ENCOUNTER — OFFICE VISIT (OUTPATIENT)
Dept: OBGYN CLINIC | Age: 29
End: 2024-10-22
Payer: COMMERCIAL

## 2024-10-22 ENCOUNTER — HOSPITAL ENCOUNTER (OUTPATIENT)
Age: 29
Setting detail: SPECIMEN
Discharge: HOME | End: 2024-10-22

## 2024-10-22 VITALS
WEIGHT: 145 LBS | HEIGHT: 66 IN | SYSTOLIC BLOOD PRESSURE: 116 MMHG | DIASTOLIC BLOOD PRESSURE: 80 MMHG | BODY MASS INDEX: 23.3 KG/M2

## 2024-10-22 DIAGNOSIS — Z01.419 WELL WOMAN EXAM WITH ROUTINE GYNECOLOGICAL EXAM: Primary | ICD-10-CM

## 2024-10-22 DIAGNOSIS — Z12.4 CERVICAL CANCER SCREENING: ICD-10-CM

## 2024-10-22 PROCEDURE — 99395 PREV VISIT EST AGE 18-39: CPT | Performed by: ADVANCED PRACTICE MIDWIFE

## 2024-10-22 ASSESSMENT — ANXIETY QUESTIONNAIRES
GAD7 TOTAL SCORE: 0
7. FEELING AFRAID AS IF SOMETHING AWFUL MIGHT HAPPEN: NOT AT ALL
3. WORRYING TOO MUCH ABOUT DIFFERENT THINGS: NOT AT ALL
4. TROUBLE RELAXING: NOT AT ALL
5. BEING SO RESTLESS THAT IT IS HARD TO SIT STILL: NOT AT ALL
1. FEELING NERVOUS, ANXIOUS, OR ON EDGE: NOT AT ALL
IF YOU CHECKED OFF ANY PROBLEMS ON THIS QUESTIONNAIRE, HOW DIFFICULT HAVE THESE PROBLEMS MADE IT FOR YOU TO DO YOUR WORK, TAKE CARE OF THINGS AT HOME, OR GET ALONG WITH OTHER PEOPLE: NOT DIFFICULT AT ALL
6. BECOMING EASILY ANNOYED OR IRRITABLE: NOT AT ALL
2. NOT BEING ABLE TO STOP OR CONTROL WORRYING: NOT AT ALL

## 2024-10-22 ASSESSMENT — PATIENT HEALTH QUESTIONNAIRE - PHQ9
SUM OF ALL RESPONSES TO PHQ9 QUESTIONS 1 & 2: 0
SUM OF ALL RESPONSES TO PHQ QUESTIONS 1-9: 0
2. FEELING DOWN, DEPRESSED OR HOPELESS: NOT AT ALL
1. LITTLE INTEREST OR PLEASURE IN DOING THINGS: NOT AT ALL
SUM OF ALL RESPONSES TO PHQ QUESTIONS 1-9: 0

## 2024-10-22 NOTE — PROGRESS NOTES
[facility]  [address]   [address]  [place] [id number]  Dept: [phone]  Annual Exam    Patient Name: Neha Kolb  Patient : 1995  MRN #: [id number]  CSN #: [id number]    Date: 10/22/2024             Primary Care Physician: Rosanne Kiran, PADMINI - CNP     HPI : Neha Kolb is a 29 y.o. female  here for an annual exam.    She reports she has only had 1 menses after the baby. Patient's last menstrual period was 10/06/2024 (exact date).  She is 4 months postpartum. The flow was medium and it was not painful. But they are usually painful.   She is sexually active and uses NFP for contraception. She has noticed decrease in sexual desire/ vaginal lubrication but it is not bothersome at this time. She is happy with this method. She denies to pain with intercourse.    She has sex with males. She has had 1 sexual partners in the last 12 months. She does not uses condoms.    She denies to bladder issues.     Chaperone for Intimate Exam  Chaperone was offered as part of the rooming process. Patient declined and agrees to continue with exam without a chaperone.      Preventive Health Screening:   Date of last pap:   Cytology Report   Date Value Ref Range Status   2020       [id number]  [facility]  [facility]  ANATOMIC PATHOLOGY  [address] [place] [address].  [place], [place] [address] [phone]  Fax: [phone]  GYNECOLOGIC CYTOLOGY REPORT    Patient Name: NEHA FLORES  MR#: [id number]  Specimen #[id number]  Source:  1: Cervical material, (ThinPrep vial, Imaging-assisted review)    Clinical History  Contraceptive use  Z01.419 Routine gyn exam without abnormal findings  High Risk HPV DNA testing is requested if the diagnosis is ASC-US  LMP:  2020  INTERPRETATION    Cervical material, (ThinPrep vial, Imaging-assisted review):  Specimen Adequacy:

## 2024-10-24 ENCOUNTER — HOSPITAL ENCOUNTER (OUTPATIENT)
Age: 29
Discharge: HOME | End: 2024-10-24
Payer: COMMERCIAL

## 2024-10-24 DIAGNOSIS — Z00.00 ENCOUNTER FOR WELL ADULT EXAM WITHOUT ABNORMAL FINDINGS: ICD-10-CM

## 2024-10-24 LAB
ALBUMIN SERPL-MCNC: 5 G/DL (ref 3.5–5.2)
ALBUMIN/GLOB SERPL: 2 {RATIO} (ref 1–2.5)
ALP SERPL-CCNC: 71 U/L (ref 35–104)
ALT SERPL-CCNC: 15 U/L (ref 10–35)
ANION GAP SERPL CALCULATED.3IONS-SCNC: 11 MMOL/L (ref 9–16)
AST SERPL-CCNC: 20 U/L (ref 10–35)
BASOPHILS # BLD: 0.05 K/UL (ref 0–0.2)
BASOPHILS NFR BLD: 1 % (ref 0–2)
BILIRUB SERPL-MCNC: 0.5 MG/DL (ref 0–1.2)
BUN SERPL-MCNC: 12 MG/DL (ref 6–20)
CALCIUM SERPL-MCNC: 10.1 MG/DL (ref 8.6–10.4)
CHLORIDE SERPL-SCNC: 103 MMOL/L (ref 98–107)
CHOLEST SERPL-MCNC: 139 MG/DL (ref 0–199)
CHOLESTEROL/HDL RATIO: 2
CO2 SERPL-SCNC: 27 MMOL/L (ref 20–31)
CREAT SERPL-MCNC: 0.8 MG/DL (ref 0.5–0.9)
EOSINOPHIL # BLD: 0.22 K/UL (ref 0–0.44)
EOSINOPHILS RELATIVE PERCENT: 6 % (ref 1–4)
ERYTHROCYTE [DISTWIDTH] IN BLOOD BY AUTOMATED COUNT: 12.9 % (ref 11.8–14.4)
GFR, ESTIMATED: >90 ML/MIN/1.73M2
GLUCOSE SERPL-MCNC: 80 MG/DL (ref 74–99)
HCT VFR BLD AUTO: 42.9 % (ref 36.3–47.1)
HDLC SERPL-MCNC: 75 MG/DL
HGB BLD-MCNC: 14.4 G/DL (ref 11.9–15.1)
IMM GRANULOCYTES # BLD AUTO: <0.03 K/UL (ref 0–0.3)
IMM GRANULOCYTES NFR BLD: 0 %
LDLC SERPL CALC-MCNC: 56 MG/DL (ref 0–100)
LYMPHOCYTES NFR BLD: 1.55 K/UL (ref 1.1–3.7)
LYMPHOCYTES RELATIVE PERCENT: 39 % (ref 24–43)
MCH RBC QN AUTO: 31.6 PG (ref 25.2–33.5)
MCHC RBC AUTO-ENTMCNC: 33.6 G/DL (ref 28.4–34.8)
MCV RBC AUTO: 94.3 FL (ref 82.6–102.9)
MONOCYTES NFR BLD: 0.32 K/UL (ref 0.1–1.2)
MONOCYTES NFR BLD: 8 % (ref 3–12)
NEUTROPHILS NFR BLD: 46 % (ref 36–65)
NEUTS SEG NFR BLD: 1.8 K/UL (ref 1.5–8.1)
NRBC BLD-RTO: 0 PER 100 WBC
PLATELET # BLD AUTO: 264 K/UL (ref 138–453)
PMV BLD AUTO: 10.8 FL (ref 8.1–13.5)
POTASSIUM SERPL-SCNC: 4.4 MMOL/L (ref 3.7–5.3)
PROT SERPL-MCNC: 7.5 G/DL (ref 6.6–8.7)
RBC # BLD AUTO: 4.55 M/UL (ref 3.95–5.11)
SODIUM SERPL-SCNC: 141 MMOL/L (ref 136–145)
TRIGL SERPL-MCNC: 37 MG/DL (ref 0–149)
TSH SERPL DL<=0.05 MIU/L-ACNC: 0.96 UIU/ML (ref 0.27–4.2)
VLDLC SERPL CALC-MCNC: 7 MG/DL
WBC OTHER # BLD: 4 K/UL (ref 3.5–11.3)

## 2024-10-24 PROCEDURE — 80053 COMPREHEN METABOLIC PANEL: CPT

## 2024-10-24 PROCEDURE — 85025 COMPLETE CBC W/AUTO DIFF WBC: CPT

## 2024-10-24 PROCEDURE — 84443 ASSAY THYROID STIM HORMONE: CPT

## 2024-10-24 PROCEDURE — 80061 LIPID PANEL: CPT

## 2024-10-24 PROCEDURE — 36415 COLL VENOUS BLD VENIPUNCTURE: CPT

## 2024-10-30 LAB — CYTOLOGY REPORT: NORMAL

## 2024-10-31 ENCOUNTER — TELEPHONE (OUTPATIENT)
Dept: OBGYN CLINIC | Age: 29
End: 2024-10-31

## 2024-10-31 DIAGNOSIS — Z01.419 WELL WOMAN EXAM WITH ROUTINE GYNECOLOGICAL EXAM: Primary | ICD-10-CM

## 2024-10-31 DIAGNOSIS — Z12.4 CERVICAL CANCER SCREENING: ICD-10-CM

## 2024-10-31 NOTE — TELEPHONE ENCOUNTER
----- Message from Elodia RUSH sent at 10/30/2024 12:57 PM EDT -----  Can you see if they can add HPV to this PAP. I must have ordered it wrong.  ----- Message -----  From: Brigette Claudio Incoming Lab Results From [facility] [place]  Sent: 10/30/2024  10:28 AM EDT  To: PADMINI Michaels CNM

## 2024-10-31 NOTE — TELEPHONE ENCOUNTER
Writer called lab and hpv can be added on. Order was placed and faxed to [phone] for [facility] Pathology.

## 2024-11-02 LAB
HPV I/H RISK 4 DNA CVX QL NAA+PROBE: NOT DETECTED
HPV SAMPLE: NORMAL
HPV, INTERPRETATION: NORMAL
HPV16 DNA CVX QL NAA+PROBE: NOT DETECTED
HPV18 DNA CVX QL NAA+PROBE: NOT DETECTED
SPECIMEN DESCRIPTION: NORMAL

## 2025-04-09 SDOH — ECONOMIC STABILITY: INCOME INSECURITY: IN THE LAST 12 MONTHS, WAS THERE A TIME WHEN YOU WERE NOT ABLE TO PAY THE MORTGAGE OR RENT ON TIME?: NO

## 2025-04-09 SDOH — ECONOMIC STABILITY: FOOD INSECURITY: WITHIN THE PAST 12 MONTHS, YOU WORRIED THAT YOUR FOOD WOULD RUN OUT BEFORE YOU GOT MONEY TO BUY MORE.: NEVER TRUE

## 2025-04-09 SDOH — ECONOMIC STABILITY: FOOD INSECURITY: WITHIN THE PAST 12 MONTHS, THE FOOD YOU BOUGHT JUST DIDN'T LAST AND YOU DIDN'T HAVE MONEY TO GET MORE.: NEVER TRUE

## 2025-04-09 SDOH — ECONOMIC STABILITY: TRANSPORTATION INSECURITY
IN THE PAST 12 MONTHS, HAS LACK OF TRANSPORTATION KEPT YOU FROM MEETINGS, WORK, OR FROM GETTING THINGS NEEDED FOR DAILY LIVING?: NO

## 2025-04-09 SDOH — ECONOMIC STABILITY: TRANSPORTATION INSECURITY
IN THE PAST 12 MONTHS, HAS THE LACK OF TRANSPORTATION KEPT YOU FROM MEDICAL APPOINTMENTS OR FROM GETTING MEDICATIONS?: NO

## 2025-04-09 ASSESSMENT — PATIENT HEALTH QUESTIONNAIRE - PHQ9
2. FEELING DOWN, DEPRESSED OR HOPELESS: NOT AT ALL
SUM OF ALL RESPONSES TO PHQ QUESTIONS 1-9: 0
1. LITTLE INTEREST OR PLEASURE IN DOING THINGS: NOT AT ALL
SUM OF ALL RESPONSES TO PHQ QUESTIONS 1-9: 0
SUM OF ALL RESPONSES TO PHQ9 QUESTIONS 1 & 2: 0
SUM OF ALL RESPONSES TO PHQ QUESTIONS 1-9: 0
1. LITTLE INTEREST OR PLEASURE IN DOING THINGS: NOT AT ALL
SUM OF ALL RESPONSES TO PHQ QUESTIONS 1-9: 0
2. FEELING DOWN, DEPRESSED OR HOPELESS: NOT AT ALL

## 2025-04-10 ENCOUNTER — OFFICE VISIT (OUTPATIENT)
Dept: OBGYN CLINIC | Age: 30
End: 2025-04-10
Payer: COMMERCIAL

## 2025-04-10 ENCOUNTER — HOSPITAL ENCOUNTER (OUTPATIENT)
Age: 30
Setting detail: SPECIMEN
Discharge: HOME | End: 2025-04-10

## 2025-04-10 VITALS
HEART RATE: 55 BPM | SYSTOLIC BLOOD PRESSURE: 100 MMHG | DIASTOLIC BLOOD PRESSURE: 66 MMHG | WEIGHT: 149.9 LBS | BODY MASS INDEX: 24.19 KG/M2

## 2025-04-10 DIAGNOSIS — N89.8 VAGINAL DISCHARGE: ICD-10-CM

## 2025-04-10 DIAGNOSIS — N93.9 ABNORMAL UTERINE BLEEDING (AUB): ICD-10-CM

## 2025-04-10 DIAGNOSIS — N93.9 ABNORMAL UTERINE BLEEDING (AUB): Primary | ICD-10-CM

## 2025-04-10 PROCEDURE — 99214 OFFICE O/P EST MOD 30 MIN: CPT | Performed by: ADVANCED PRACTICE MIDWIFE

## 2025-04-10 PROCEDURE — 36415 COLL VENOUS BLD VENIPUNCTURE: CPT | Performed by: ADVANCED PRACTICE MIDWIFE

## 2025-04-10 ASSESSMENT — ENCOUNTER SYMPTOMS
GASTROINTESTINAL NEGATIVE: 1
EYES NEGATIVE: 1
RESPIRATORY NEGATIVE: 1
ALLERGIC/IMMUNOLOGIC NEGATIVE: 1

## 2025-04-10 NOTE — PROGRESS NOTES
Patient is here for irregular periods   
  Constitutional:       General: She is not in acute distress.     Appearance: Normal appearance. She is normal weight. She is not ill-appearing, toxic-appearing or diaphoretic.   Cardiovascular:      Rate and Rhythm: Normal rate.   Pulmonary:      Effort: Pulmonary effort is normal. No respiratory distress.   Genitourinary:     Comments: deferred  Skin:     General: Skin is warm and dry.   Neurological:      Mental Status: She is alert and oriented to person, place, and time. Mental status is at baseline.   Psychiatric:         Mood and Affect: Mood normal.         Behavior: Behavior normal.         Thought Content: Thought content normal.         Judgment: Judgment normal.        Assessment/Plan:    Abnormal uterine bleeding (AUB)  -     US PELVIS COMPLETE; Future  -     US NON OB TRANSVAGINAL; Future  -     TSH; Future  -     T4, Free; Future  -     COLLECTION VENOUS BLOOD,VENIPUNCTURE  -     Reviewed possibly hormonal vs ovarian cyst. Plan for pelvic ultrasound and thyroid labs and will follow up. Reviewed if no identified cause she may continue to monitor, if worsening/new sx arise she may reach out. Not interested in BC at this time for cycle regulation. Reviewed gut health/probiotic after abx use.     Vaginal discharge  -     Vaginitis DNA Probe; Future  -     Will treat any positive findings. Declines STI screening. If BV plan for PV Metrogel.       Return if symptoms worsen or fail to improve.    Problem list reviewed and updated as indicated.   Upon completion of the visit all questions were answered.  History was reviewed as documented on Epic Navigator.    The patient, Neha Kolb,  was seen with a total time spent of 35 minutes for the visit on this date of service by the [facility]  The time component involved both face-to-face (counseling and education) and non face-to-face time (care coordination), spent in determining the total time component.

## 2025-04-11 ENCOUNTER — RESULTS FOLLOW-UP (OUTPATIENT)
Dept: OBGYN CLINIC | Age: 30
End: 2025-04-11

## 2025-04-11 DIAGNOSIS — N76.0 BV (BACTERIAL VAGINOSIS): Primary | ICD-10-CM

## 2025-04-11 DIAGNOSIS — B96.89 BV (BACTERIAL VAGINOSIS): Primary | ICD-10-CM

## 2025-04-11 LAB
CANDIDA SPECIES: NEGATIVE
GARDNERELLA VAGINALIS: POSITIVE
SOURCE: ABNORMAL
T4 FREE SERPL-MCNC: 1.3 NG/DL (ref 0.9–1.7)
TRICHOMONAS: NEGATIVE
TSH SERPL DL<=0.05 MIU/L-ACNC: 0.94 UIU/ML (ref 0.27–4.2)

## 2025-04-11 RX ORDER — METRONIDAZOLE 7.5 MG/G
GEL VAGINAL
Qty: 1 EACH | Refills: 0 | Status: SHIPPED | OUTPATIENT
Start: 2025-04-11 | End: 2025-04-18

## 2025-04-15 ENCOUNTER — HOSPITAL ENCOUNTER (OUTPATIENT)
Dept: ULTRASOUND IMAGING | Age: 30
Discharge: HOME | End: 2025-04-17
Payer: COMMERCIAL

## 2025-04-15 DIAGNOSIS — N93.9 ABNORMAL UTERINE BLEEDING (AUB): ICD-10-CM

## 2025-04-15 PROCEDURE — 76830 TRANSVAGINAL US NON-OB: CPT

## 2025-04-15 PROCEDURE — 76856 US EXAM PELVIC COMPLETE: CPT

## 2025-04-17 ENCOUNTER — RESULTS FOLLOW-UP (OUTPATIENT)
Dept: OBGYN CLINIC | Age: 30
End: 2025-04-17

## 2025-04-17 DIAGNOSIS — N76.0 BV (BACTERIAL VAGINOSIS): ICD-10-CM

## 2025-04-17 DIAGNOSIS — B96.89 BV (BACTERIAL VAGINOSIS): ICD-10-CM

## 2025-04-17 DIAGNOSIS — R10.2 PELVIC PAIN: Primary | ICD-10-CM

## 2025-04-23 RX ORDER — CLINDAMYCIN PHOSPHATE 20 MG/G
CREAM VAGINAL
Qty: 1 EACH | Refills: 0 | Status: SHIPPED | OUTPATIENT
Start: 2025-04-23 | End: 2025-04-30

## 2025-04-25 ENCOUNTER — HOSPITAL ENCOUNTER (OUTPATIENT)
Age: 30
Setting detail: SPECIMEN
Discharge: HOME | End: 2025-04-25

## 2025-04-25 DIAGNOSIS — R10.2 PELVIC PAIN: ICD-10-CM

## 2025-04-26 ENCOUNTER — RESULTS FOLLOW-UP (OUTPATIENT)
Dept: OBGYN CLINIC | Age: 30
End: 2025-04-26

## 2025-04-26 LAB
MICROORGANISM SPEC CULT: NORMAL
SERVICE CMNT-IMP: NORMAL
SPECIMEN DESCRIPTION: NORMAL